# Patient Record
Sex: MALE | Race: WHITE | NOT HISPANIC OR LATINO | Employment: PART TIME | URBAN - METROPOLITAN AREA
[De-identification: names, ages, dates, MRNs, and addresses within clinical notes are randomized per-mention and may not be internally consistent; named-entity substitution may affect disease eponyms.]

---

## 2017-06-06 DIAGNOSIS — M1A.9XX0 CHRONIC GOUT WITHOUT TOPHUS, UNSPECIFIED CAUSE, UNSPECIFIED SITE: ICD-10-CM

## 2017-06-06 NOTE — TELEPHONE ENCOUNTER
allopurinol (ZYLOPRIM) 300 MG tablet  Last Written Prescription Date: 12/20/16  Last Fill Quantity: 90, # refills: 0  Last Office Visit with [facility] or [facility] prescribing provider:  4/23/15        Uric Acid   Date Value Ref Range Status   04/23/2015 3.3 (L) 3.5 - 7.2 mg/dL Final   ]  Creatinine   Date Value Ref Range Status   04/02/2015 0.85 0.66 - 1.25 mg/dL Final   ]  No results found for: WBC  No results found for: RBC  No results found for: HGB  No results found for: HCT  No components found for: MCT  No results found for: MCV  No results found for: MCH  No results found for: MCHC  No results found for: RDW  No results found for: PLT  Lab Results   Component Value Date    AST 20 04/02/2015     Lab Results   Component Value Date    ALT 56 04/02/2015           Idaz Faarax  [facility]

## 2017-06-08 RX ORDER — ALLOPURINOL 300 MG/1
300 TABLET ORAL DAILY
Qty: 90 TABLET | Refills: 0 | Status: SHIPPED | OUTPATIENT
Start: 2017-06-08 | End: 2017-11-16

## 2017-06-08 NOTE — TELEPHONE ENCOUNTER
Routing refill request to provider for review/approval because:  Labs out of range:  Uric Acid  A break in medication    Larisa Victor RN

## 2017-07-05 DIAGNOSIS — M1A.9XX0 CHRONIC GOUT WITHOUT TOPHUS, UNSPECIFIED CAUSE, UNSPECIFIED SITE: ICD-10-CM

## 2017-07-05 NOTE — TELEPHONE ENCOUNTER
colchicine (COLCRYS) 0.6 MG tablet       Last Written Prescription Date: 12/20/16  Last Fill Quantity: 90, # refills: 0  Last Office Visit with [facility] or [facility] prescribing provider:  04/23/15        Uric Acid   Date Value Ref Range Status   04/23/2015 3.3 (L) 3.5 - 7.2 mg/dL Final   ]  Creatinine   Date Value Ref Range Status   04/02/2015 0.85 0.66 - 1.25 mg/dL Final   ]  No results found for: WBC  No results found for: RBC  No results found for: HGB  No results found for: HCT  No components found for: MCT  No results found for: MCV  No results found for: MCH  No results found for: MCHC  No results found for: RDW  No results found for: PLT  Lab Results   Component Value Date    AST 20 04/02/2015     Lab Results   Component Value Date    ALT 56 04/02/2015         [facility]

## 2017-07-08 NOTE — TELEPHONE ENCOUNTER
Routing refill request to provider for review/approval because:  Labs out of range:  Uric Acid  Lesley Hudson RN

## 2017-07-10 RX ORDER — COLCHICINE 0.6 MG/1
0.6 TABLET ORAL DAILY
Qty: 90 TABLET | Refills: 0 | Status: SHIPPED | OUTPATIENT
Start: 2017-07-10 | End: 2017-11-16

## 2017-11-06 DIAGNOSIS — M1A.9XX0 CHRONIC GOUT WITHOUT TOPHUS, UNSPECIFIED CAUSE, UNSPECIFIED SITE: ICD-10-CM

## 2017-11-08 RX ORDER — ALLOPURINOL 300 MG/1
TABLET ORAL
Qty: 90 TABLET | Refills: 0 | OUTPATIENT
Start: 2017-11-08

## 2017-11-08 RX ORDER — COLCHICINE 0.6 MG/1
TABLET ORAL
Qty: 90 TABLET | Refills: 0 | OUTPATIENT
Start: 2017-11-08

## 2017-11-08 NOTE — TELEPHONE ENCOUNTER
Pt advised at last refill ov and labs needed, no appointment's made.  To provider to advise.  Hermelinda Reyna RN

## 2017-11-16 ENCOUNTER — OFFICE VISIT (OUTPATIENT)
Dept: FAMILY MEDICINE | Facility: CLINIC | Age: 32
End: 2017-11-16
Payer: COMMERCIAL

## 2017-11-16 VITALS
TEMPERATURE: 98.3 F | HEART RATE: 71 BPM | HEIGHT: 68 IN | WEIGHT: 213 LBS | DIASTOLIC BLOOD PRESSURE: 75 MMHG | SYSTOLIC BLOOD PRESSURE: 122 MMHG | BODY MASS INDEX: 32.28 KG/M2 | OXYGEN SATURATION: 96 %

## 2017-11-16 DIAGNOSIS — Z28.21 VACCINATION NOT CARRIED OUT BECAUSE OF PATIENT REFUSAL: ICD-10-CM

## 2017-11-16 DIAGNOSIS — M1A.09X0 IDIOPATHIC CHRONIC GOUT OF MULTIPLE SITES WITHOUT TOPHUS: Primary | ICD-10-CM

## 2017-11-16 PROBLEM — E66.811 OBESITY, CLASS I, BMI 30-34.9: Status: ACTIVE | Noted: 2017-11-16

## 2017-11-16 LAB
ALBUMIN SERPL-MCNC: 4.1 G/DL (ref 3.4–5)
ALP SERPL-CCNC: 63 U/L (ref 40–150)
ALT SERPL W P-5'-P-CCNC: 37 U/L (ref 0–70)
ANION GAP SERPL CALCULATED.3IONS-SCNC: 6 MMOL/L (ref 3–14)
AST SERPL W P-5'-P-CCNC: 19 U/L (ref 0–45)
BASOPHILS # BLD AUTO: 0 10E9/L (ref 0–0.2)
BASOPHILS NFR BLD AUTO: 0.4 %
BILIRUB SERPL-MCNC: 0.5 MG/DL (ref 0.2–1.3)
BUN SERPL-MCNC: 12 MG/DL (ref 7–30)
CALCIUM SERPL-MCNC: 8.7 MG/DL (ref 8.5–10.1)
CHLORIDE SERPL-SCNC: 105 MMOL/L (ref 94–109)
CK SERPL-CCNC: 70 U/L (ref 30–300)
CO2 SERPL-SCNC: 27 MMOL/L (ref 20–32)
CREAT SERPL-MCNC: 0.72 MG/DL (ref 0.66–1.25)
DIFFERENTIAL METHOD BLD: NORMAL
EOSINOPHIL # BLD AUTO: 0.2 10E9/L (ref 0–0.7)
EOSINOPHIL NFR BLD AUTO: 1.8 %
ERYTHROCYTE [DISTWIDTH] IN BLOOD BY AUTOMATED COUNT: 12.6 % (ref 10–15)
GFR SERPL CREATININE-BSD FRML MDRD: >90 ML/MIN/1.7M2
GLUCOSE SERPL-MCNC: 95 MG/DL (ref 70–99)
HCT VFR BLD AUTO: 46.8 % (ref 40–53)
HGB BLD-MCNC: 16.2 G/DL (ref 13.3–17.7)
LYMPHOCYTES # BLD AUTO: 2.6 10E9/L (ref 0.8–5.3)
LYMPHOCYTES NFR BLD AUTO: 31.2 %
MCH RBC QN AUTO: 30.1 PG (ref 26.5–33)
MCHC RBC AUTO-ENTMCNC: 34.6 G/DL (ref 31.5–36.5)
MCV RBC AUTO: 87 FL (ref 78–100)
MONOCYTES # BLD AUTO: 0.5 10E9/L (ref 0–1.3)
MONOCYTES NFR BLD AUTO: 6.3 %
NEUTROPHILS # BLD AUTO: 5 10E9/L (ref 1.6–8.3)
NEUTROPHILS NFR BLD AUTO: 60.3 %
PLATELET # BLD AUTO: 230 10E9/L (ref 150–450)
POTASSIUM SERPL-SCNC: 4.5 MMOL/L (ref 3.4–5.3)
PROT SERPL-MCNC: 7.4 G/DL (ref 6.8–8.8)
RBC # BLD AUTO: 5.39 10E12/L (ref 4.4–5.9)
SODIUM SERPL-SCNC: 138 MMOL/L (ref 133–144)
URATE SERPL-MCNC: 10.2 MG/DL (ref 3.5–7.2)
WBC # BLD AUTO: 8.3 10E9/L (ref 4–11)

## 2017-11-16 PROCEDURE — 36415 COLL VENOUS BLD VENIPUNCTURE: CPT | Performed by: FAMILY MEDICINE

## 2017-11-16 PROCEDURE — 84550 ASSAY OF BLOOD/URIC ACID: CPT | Performed by: FAMILY MEDICINE

## 2017-11-16 PROCEDURE — 82550 ASSAY OF CK (CPK): CPT | Performed by: FAMILY MEDICINE

## 2017-11-16 PROCEDURE — 99214 OFFICE O/P EST MOD 30 MIN: CPT | Performed by: FAMILY MEDICINE

## 2017-11-16 PROCEDURE — 80053 COMPREHEN METABOLIC PANEL: CPT | Performed by: FAMILY MEDICINE

## 2017-11-16 PROCEDURE — 85025 COMPLETE CBC W/AUTO DIFF WBC: CPT | Performed by: FAMILY MEDICINE

## 2017-11-16 RX ORDER — ALLOPURINOL 300 MG/1
300 TABLET ORAL DAILY
Qty: 90 TABLET | Refills: 1 | Status: SHIPPED | OUTPATIENT
Start: 2017-11-16 | End: 2018-06-13

## 2017-11-16 RX ORDER — COLCHICINE 0.6 MG/1
0.6 TABLET ORAL DAILY
Qty: 90 TABLET | Refills: 1 | Status: SHIPPED | OUTPATIENT
Start: 2017-11-16 | End: 2019-02-18

## 2017-11-16 ASSESSMENT — PAIN SCALES - GENERAL: PAINLEVEL: NO PAIN (0)

## 2017-11-16 NOTE — NURSING NOTE
"Chief Complaint   Patient presents with     Physical     Recheck Medication     Arthritis       Initial /75 (BP Location: Left arm, Patient Position: Chair, Cuff Size: Adult Large)  Pulse 71  Temp 98.3  F (36.8  C) (Oral)  Ht 5' 8\" (1.727 m)  Wt 213 lb (96.6 kg)  SpO2 96%  BMI 32.39 kg/m2 Estimated body mass index is 32.39 kg/(m^2) as calculated from the following:    Height as of this encounter: 5' 8\" (1.727 m).    Weight as of this encounter: 213 lb (96.6 kg).  Medication Reconciliation: complete   YVONNE Santos MA      "

## 2017-11-16 NOTE — MR AVS SNAPSHOT
After Visit Summary   11/16/2017    Garry Dupree    MRN: [id number]           Patient Information     Date Of Birth          1985        Visit Information        Provider Department      11/16/2017 9:20 AM Nestor Shelton MD [facility]        Today's Diagnoses     Idiopathic chronic gout of multiple sites without tophus    -  1    Vaccination not carried out because of patient refusal          Care Instructions      Preventive Health Recommendations  Male Ages 26 - 39    Yearly exam:             See your health care provider every year in order to  o   Review health changes.   o   Discuss preventive care.    o   Review your medicines if your doctor has prescribed any.    You should be tested each year for STDs (sexually transmitted diseases), if you re at risk.     After age 35, talk to your provider about cholesterol testing. If you are at risk for heart disease, have your cholesterol tested at least every 5 years.     If you are at risk for diabetes, you should have a diabetes test (fasting glucose).  Shots: Get a flu shot each year. Get a tetanus shot every 10 years.     Nutrition:    Eat at least 5 servings of fruits and vegetables daily.     Eat whole-grain bread, whole-wheat pasta and brown rice instead of white grains and rice.     Talk to your provider about Calcium and Vitamin D.     Lifestyle    Exercise for at least 150 minutes a week (30 minutes a day, 5 days a week). This will help you control your weight and prevent disease.     Limit alcohol to one drink per day.     No smoking.     Wear sunscreen to prevent skin cancer.     See your dentist every six months for an exam and cleaning.             Follow-ups after your visit        Follow-up notes from your care team     Return in about 6 months (around 5/16/2018) for recheck medications, lab tests.      Who to contact     If you have questions or need follow up information about today's clinic visit or  "your schedule please contact [facility] directly at [phone].  Normal or non-critical lab and imaging results will be communicated to you by MyChart, letter or phone within 4 business days after the clinic has received the results. If you do not hear from us within 7 days, please contact the clinic through MyChart or phone. If you have a critical or abnormal lab result, we will notify you by phone as soon as possible.  Submit refill requests through [facility] or call your pharmacy and they will forward the refill request to us. Please allow 3 business days for your refill to be completed.          Additional Information About Your Visit        Care EveryWhere ID     This is your Care EveryWhere ID. This could be used by other organizations to access your [facility] medical records  [id number]        Your Vitals Were     Pulse Temperature Height Pulse Oximetry BMI (Body Mass Index)       71 98.3  F (36.8  C) (Oral) 5' 8\" (1.727 m) 96% 32.39 kg/m2        Blood Pressure from Last 3 Encounters:   11/16/17 122/75   04/23/15 123/78   04/02/15 116/80    Weight from Last 3 Encounters:   11/16/17 213 lb (96.6 kg)   04/23/15 200 lb (90.7 kg)   04/02/15 196 lb 12.8 oz (89.3 kg)              We Performed the Following     CBC with platelets differential     CK total     Comprehensive metabolic panel (BMP + Alb, Alk Phos, ALT, AST, Total. Bili, TP)     Uric acid          Today's Medication Changes          These changes are accurate as of: 11/16/17  9:53 AM.  If you have any questions, ask your nurse or doctor.               These medicines have changed or have updated prescriptions.        Dose/Directions    allopurinol 300 MG tablet   Commonly known as:  ZYLOPRIM   This may have changed:  additional instructions   Used for:  Idiopathic chronic gout of multiple sites without tophus   Changed by:  Nestor Shelton MD        Dose:  300 mg   Take 1 tablet (300 mg) by mouth daily for gout prevention. "   Quantity:  90 tablet   Refills:  1       colchicine 0.6 MG tablet   Commonly known as:  COLCRYS   This may have changed:  additional instructions   Used for:  Idiopathic chronic gout of multiple sites without tophus   Changed by:  Nestor Shelton MD        Dose:  0.6 mg   Take 1 tablet (0.6 mg) by mouth daily for gout prevention.   Quantity:  90 tablet   Refills:  1            Where to get your medicines      [facility] [address]  AT [facility]  [address] [facility] [facility] [address]     Phone:  [phone]     allopurinol 300 MG tablet    colchicine 0.6 MG tablet                Primary Care Provider Office Phone # Fax #    Jack Watkins [phone] [address] [facility] [address]        Equal Access to Services     [facility]: Hadii aad ku hadasho Soomaali, waaxda luqadaha, qaybta kaalmada adeegyada, waxay diamondin hayaan amada swartz . So [facility] [phone].    ATENCIÓN: Si habla español, tiene a nelson disposición servicios gratuitos de asistencia lingüística. Llame al [phone].    We comply with applicable federal civil rights laws and [place] laws. We do not discriminate on the basis of race, color, national origin, age, disability, sex, sexual orientation, or gender identity.            Thank you!     Thank you for choosing [facility]  for your care. Our goal is always to provide you with excellent care. Hearing back from our patients is one way we can continue to improve our services. Please take a few minutes to complete the written survey that you may receive in the mail after your visit with us. Thank you!             Your Updated Medication List - Protect others around you: Learn how to safely use, store and throw away your medicines at www.disposemymeds.org.          This list is accurate as of: 11/16/17  9:53 AM.  Always use your most  recent med list.                   Brand Name Dispense Instructions for use Diagnosis    allopurinol 300 MG tablet    ZYLOPRIM    90 tablet    Take 1 tablet (300 mg) by mouth daily for gout prevention.    Idiopathic chronic gout of multiple sites without tophus       colchicine 0.6 MG tablet    COLCRYS    90 tablet    Take 1 tablet (0.6 mg) by mouth daily for gout prevention.    Idiopathic chronic gout of multiple sites without tophus

## 2017-11-16 NOTE — LETTER
November 16, 2017      Garry Dupree  [address] [place] [address]        Dear Mr. Dupree,    Your uric acid level is very high, consistent with untreated gout: This is likely to improve when you recheck it while medicated.  All of the rest of your test results were within the expected range for you.      Resulted Orders   Comprehensive metabolic panel (BMP + Alb, Alk Phos, ALT, AST, Total. Bili, TP)   Result Value Ref Range    Sodium 138 133 - 144 mmol/L    Potassium 4.5 3.4 - 5.3 mmol/L    Chloride 105 94 - 109 mmol/L    Carbon Dioxide 27 20 - 32 mmol/L    Anion Gap 6 3 - 14 mmol/L    Glucose 95 70 - 99 mg/dL    Urea Nitrogen 12 7 - 30 mg/dL    Creatinine 0.72 0.66 - 1.25 mg/dL    GFR Estimate >90 >60 mL/min/1.7m2      Comment:      Non  GFR Calc    GFR Estimate If Black >90 >60 mL/min/1.7m2      Comment:       GFR Calc    Calcium 8.7 8.5 - 10.1 mg/dL    Bilirubin Total 0.5 0.2 - 1.3 mg/dL    Albumin 4.1 3.4 - 5.0 g/dL    Protein Total 7.4 6.8 - 8.8 g/dL    Alkaline Phosphatase 63 40 - 150 U/L    ALT 37 0 - 70 U/L    AST 19 0 - 45 U/L   CBC with platelets differential   Result Value Ref Range    WBC 8.3 4.0 - 11.0 10e9/L    RBC Count 5.39 4.4 - 5.9 10e12/L    Hemoglobin 16.2 13.3 - 17.7 g/dL    Hematocrit 46.8 40.0 - 53.0 %    MCV 87 78 - 100 fl    MCH 30.1 26.5 - 33.0 pg    MCHC 34.6 31.5 - 36.5 g/dL    RDW 12.6 10.0 - 15.0 %    Platelet Count 230 150 - 450 10e9/L    Diff Method Automated Method     % Neutrophils 60.3 %    % Lymphocytes 31.2 %    % Monocytes 6.3 %    % Eosinophils 1.8 %    % Basophils 0.4 %    Absolute Neutrophil 5.0 1.6 - 8.3 10e9/L    Absolute Lymphocytes 2.6 0.8 - 5.3 10e9/L    Absolute Monocytes 0.5 0.0 - 1.3 10e9/L    Absolute Eosinophils 0.2 0.0 - 0.7 10e9/L    Absolute Basophils 0.0 0.0 - 0.2 10e9/L   Uric acid   Result Value Ref Range    Uric Acid 10.2 (H) 3.5 - 7.2 mg/dL   CK total   Result Value Ref Range    CK Total 70 30 - 300 U/L        Please contact the clinic if you have additional questions.  Thank you.    Sincerely,      Nestor Shelton MD/v

## 2017-11-16 NOTE — PROGRESS NOTES
SUBJECTIVE:   CC: SHEREEN Dupree is an 32 year old male who presents for an update/refill of his gout medications.     Healthy Habits:    Do you get at least three servings of calcium containing foods daily (dairy, green leafy vegetables, etc.)? yes    Amount of exercise or daily activities, outside of work: none    Problems taking medications regularly No    Medication side effects: No    Have you had an eye exam in the past two years? yes    Do you see a dentist twice per year? no    Do you have sleep apnea, excessive snoring or daytime drowsiness?no      Today's PHQ-2 Score:   PHQ-2 ( 1999 Pfizer) 4/2/2015 3/13/2014   Q1: Little interest or pleasure in doing things 0 0   Q2: Feeling down, depressed or hopeless 0 0   PHQ-2 Score 0 0       Abuse: Current or Past(Physical, Sexual or Emotional)- NO  Do you feel safe in your environment - YES  Social History   Substance Use Topics     Smoking status: Never Smoker     Smokeless tobacco: Never Used     Alcohol use 7.0 oz/week     14 Standard drinks or equivalent per week      Comment: socially     The patient does not drink >3 drinks per day nor >7 drinks per week.    Past medical, family, and social histories, medications, and allergies are reviewed and updated in Epic.     ROS:  C: NEGATIVE for fever, chills, change in weight  I: NEGATIVE for worrisome rashes, moles or lesions  E: NEGATIVE for vision changes or irritation  ENT: NEGATIVE for ear, mouth and throat problems  R: NEGATIVE for significant cough or SOB  CV: NEGATIVE for chest pain, palpitations or peripheral edema  GI: NEGATIVE for nausea, abdominal pain, heartburn, or change in bowel habits   male: NEGATIVE for dysuria, hematuria, decreased urinary stream, erectile dysfunction, urethral discharge  M: His gout attacks are not just focused on his right ankles, but both his ankles and first MTP joints. He takes both allopurinol and colchicine daily. Since starting these medications 2 years ago, he has  "not had any gout flares. Previously, he was treated with a lot of prednisone and ibuprofen when he had a gout flare and was not on medication. 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 Of note, he has not taken any medication for around a week because he ran out.  N: NEGATIVE for weakness, dizziness or paresthesias  P: NEGATIVE for changes in mood or affect    This document serves as a record of the services and decisions personally performed and made by Dr. Shelton. It was created on his behalf by Cora Rodriguez, a trained medical scribe. The creation of this document is based the provider's statements to the medical scribe.  Cora Rodriguez November 16, 2017 9:41 AM   OBJECTIVE:   /75 (BP Location: Left arm, Patient Position: Chair, Cuff Size: Adult Large)  Pulse 71  Temp 98.3  F (36.8  C) (Oral)  Ht 5' 8\" (1.727 m)  Wt 213 lb (96.6 kg)  SpO2 96%  BMI 32.39 kg/m2  EXAM:  GENERAL: healthy, alert and no distress  EYES: Eyes grossly normal to inspection, PERRL, EOMI, sclerae white and conjunctivae normal  MS: no gross musculoskeletal defects noted, no edema  SKIN: no suspicious lesions or rashes  NEURO: Normal strength and tone, sensory exam grossly normal, mentation intact, oriented times 3 and cranial nerves 2-12 intact  PSYCH: mentation appears normal, affect normal/bright     ASSESSMENT/PLAN:   (M1A.09X0) Idiopathic chronic gout of multiple sites without tophus  (primary encounter diagnosis)  Comment: Current regimen has appeared to prevent gout flares for 2 years. His occupation increases his risk for gout flares. His uric acid may be high today because he has been off the allopurinol for a week.   Plan: colchicine (COLCRYS) 0.6 MG tablet, allopurinol        (ZYLOPRIM) 300 MG tablet, Comprehensive         metabolic panel (BMP + Alb, Alk Phos, ALT, AST,     "    Total. Bili, TP), CBC with platelets         differential, Uric acid, CK total        Return in about 6 months (around 5/16/2018) for recheck medications, lab tests (uric acid while medicated).     (Z28.21) Vaccination not carried out because of patient refusal  Comment: Influenza and TD vaccines offered but declined by the patient.   Plan: VIS provided for both vaccines.    The information in this document, created by the medical scribe for me, accurately reflects the services I personally performed and the decisions made by me. I have reviewed and approved this document for accuracy prior to leaving the patient care area. November 16, 2017 9:41 AM    Nestor Shelton MD  [facility]

## 2018-06-13 DIAGNOSIS — M1A.09X0 IDIOPATHIC CHRONIC GOUT OF MULTIPLE SITES WITHOUT TOPHUS: ICD-10-CM

## 2018-06-13 NOTE — TELEPHONE ENCOUNTER
Routing refill request to provider for review/approval because:  Labs out of range:  Uric acid    Jannette Rivera RN, BSN

## 2018-06-13 NOTE — LETTER
June 21, 2018          Garry Dupree  [address] [place] [address]          Dear Garry,    At [facility] we care about your health and are committed to providing quality patient care. Regular appointments are a vital part of the care and management of your health and can help prevent many of the complications that can occur.      It has come to our attention that you are due for Medication check prior to refills.  Please call [facility] at [phone] soon to schedule your follow up appointment.    If you have transferred care to another clinic please call to inform us so that we do not continue to send you reminder letters.    Sincerely,        [facility] Care Team/[facility]

## 2018-06-13 NOTE — TELEPHONE ENCOUNTER
"Requested Prescriptions   Pending Prescriptions Disp Refills     allopurinol (ZYLOPRIM) 300 MG tablet [Pharmacy Med Name: ALLOPURINOL 300MG TABLETS]  Last Written Prescription Date:  11/16/17  Last Fill Quantity: 90,  # refills: 1   Last Office Visit with [facility] or [facility] prescribing provider:  11/16/17   Future Office Visit:    90 tablet 0     Sig: TAKE 1 TABLET(300 MG) BY MOUTH DAILY FOR GOUT PREVENTION    Gout Agents Protocol Failed    6/13/2018 10:07 AM       Failed - Has Uric Acid on file in past 12 months and value is less than 6    Recent Labs   Lab Test  11/16/17   0959   URIC  10.2*     If level is 6mg/dL or greater, ok to refill one time and refer to provider.          Passed - CBC on file in past 12 months    Recent Labs   Lab Test  11/16/17   0959   WBC  8.3   RBC  5.39   HGB  16.2   HCT  46.8   PLT  230       For [facility] ONLY: [id number] = WBC, [id number] = RBC         Passed - ALT on file in past 12 months    Recent Labs   Lab Test  11/16/17   0959   ALT  37            Passed - Recent (12 mo) or future (30 days) visit within the authorizing provider's specialty    Patient had office visit in the last 12 months or has a visit in the next 30 days with authorizing provider or within the authorizing provider's specialty.  See \"Patient Info\" tab in inbasket, or \"Choose Columns\" in Meds & Orders section of the refill encounter.           Passed - Patient is age 18 or older       Passed - Normal serum creatinine on file in the past 12 months    Recent Labs   Lab Test  11/16/17   0959   CR  0.72               "

## 2018-06-18 RX ORDER — ALLOPURINOL 300 MG/1
TABLET ORAL
Qty: 90 TABLET | Refills: 0 | Status: SHIPPED | OUTPATIENT
Start: 2018-06-18 | End: 2019-02-18

## 2018-06-19 NOTE — TELEPHONE ENCOUNTER
This writer attempted to contact patient on 06/19/18    Reason for call schedule follow up appointment and left detailed message.    If patient calls back:   Relay message need to make appointment for medication recheck, (read verbatim), document that pt called and close encounter    Yana Horner MA

## 2018-06-21 NOTE — TELEPHONE ENCOUNTER
Contacted patient and he hung up.  Letter will be sent to patient instead.    Ubaldo Ibrahim CMA

## 2018-09-26 ENCOUNTER — OFFICE VISIT (OUTPATIENT)
Dept: FAMILY MEDICINE | Facility: CLINIC | Age: 33
End: 2018-09-26
Payer: COMMERCIAL

## 2018-09-26 VITALS
HEIGHT: 68 IN | SYSTOLIC BLOOD PRESSURE: 122 MMHG | BODY MASS INDEX: 32.89 KG/M2 | RESPIRATION RATE: 18 BRPM | HEART RATE: 66 BPM | WEIGHT: 217 LBS | DIASTOLIC BLOOD PRESSURE: 89 MMHG | TEMPERATURE: 97.9 F | OXYGEN SATURATION: 97 %

## 2018-09-26 DIAGNOSIS — G47.00 INSOMNIA, UNSPECIFIED TYPE: ICD-10-CM

## 2018-09-26 DIAGNOSIS — R82.90 BAD ODOR OF URINE: Primary | ICD-10-CM

## 2018-09-26 DIAGNOSIS — M1A.09X0 IDIOPATHIC CHRONIC GOUT OF MULTIPLE SITES WITHOUT TOPHUS: ICD-10-CM

## 2018-09-26 DIAGNOSIS — Z78.9 ALCOHOL USE: ICD-10-CM

## 2018-09-26 DIAGNOSIS — Z11.4 SCREENING FOR HIV (HUMAN IMMUNODEFICIENCY VIRUS): ICD-10-CM

## 2018-09-26 LAB
ALBUMIN SERPL-MCNC: 4 G/DL (ref 3.4–5)
ALBUMIN UR-MCNC: NEGATIVE MG/DL
ALP SERPL-CCNC: 60 U/L (ref 40–150)
ALT SERPL W P-5'-P-CCNC: 48 U/L (ref 0–70)
ANION GAP SERPL CALCULATED.3IONS-SCNC: 8 MMOL/L (ref 3–14)
APPEARANCE UR: CLEAR
AST SERPL W P-5'-P-CCNC: 28 U/L (ref 0–45)
BASOPHILS # BLD AUTO: 0 10E9/L (ref 0–0.2)
BASOPHILS NFR BLD AUTO: 0.3 %
BILIRUB SERPL-MCNC: 0.5 MG/DL (ref 0.2–1.3)
BILIRUB UR QL STRIP: NEGATIVE
BUN SERPL-MCNC: 11 MG/DL (ref 7–30)
CALCIUM SERPL-MCNC: 8.8 MG/DL (ref 8.5–10.1)
CHLORIDE SERPL-SCNC: 107 MMOL/L (ref 94–109)
CO2 SERPL-SCNC: 25 MMOL/L (ref 20–32)
COLOR UR AUTO: YELLOW
CREAT SERPL-MCNC: 0.78 MG/DL (ref 0.66–1.25)
DIFFERENTIAL METHOD BLD: NORMAL
EOSINOPHIL # BLD AUTO: 0.1 10E9/L (ref 0–0.7)
EOSINOPHIL NFR BLD AUTO: 1.7 %
ERYTHROCYTE [DISTWIDTH] IN BLOOD BY AUTOMATED COUNT: 12.6 % (ref 10–15)
GFR SERPL CREATININE-BSD FRML MDRD: >90 ML/MIN/1.7M2
GLUCOSE SERPL-MCNC: 98 MG/DL (ref 70–99)
GLUCOSE UR STRIP-MCNC: NEGATIVE MG/DL
HCT VFR BLD AUTO: 45.7 % (ref 40–53)
HGB BLD-MCNC: 15.9 G/DL (ref 13.3–17.7)
HGB UR QL STRIP: NEGATIVE
KETONES UR STRIP-MCNC: NEGATIVE MG/DL
LEUKOCYTE ESTERASE UR QL STRIP: NEGATIVE
LYMPHOCYTES # BLD AUTO: 2.5 10E9/L (ref 0.8–5.3)
LYMPHOCYTES NFR BLD AUTO: 32.1 %
MCH RBC QN AUTO: 30.7 PG (ref 26.5–33)
MCHC RBC AUTO-ENTMCNC: 34.8 G/DL (ref 31.5–36.5)
MCV RBC AUTO: 88 FL (ref 78–100)
MONOCYTES # BLD AUTO: 0.6 10E9/L (ref 0–1.3)
MONOCYTES NFR BLD AUTO: 7.6 %
NEUTROPHILS # BLD AUTO: 4.5 10E9/L (ref 1.6–8.3)
NEUTROPHILS NFR BLD AUTO: 58.3 %
NITRATE UR QL: NEGATIVE
PH UR STRIP: 8 PH (ref 5–7)
PLATELET # BLD AUTO: 209 10E9/L (ref 150–450)
POTASSIUM SERPL-SCNC: 4.5 MMOL/L (ref 3.4–5.3)
PROT SERPL-MCNC: 7.7 G/DL (ref 6.8–8.8)
RBC # BLD AUTO: 5.18 10E12/L (ref 4.4–5.9)
SODIUM SERPL-SCNC: 140 MMOL/L (ref 133–144)
SOURCE: ABNORMAL
SP GR UR STRIP: 1.01 (ref 1–1.03)
TSH SERPL DL<=0.005 MIU/L-ACNC: 1.09 MU/L (ref 0.4–4)
URATE SERPL-MCNC: 7.2 MG/DL (ref 3.5–7.2)
UROBILINOGEN UR STRIP-ACNC: 0.2 EU/DL (ref 0.2–1)
WBC # BLD AUTO: 7.6 10E9/L (ref 4–11)

## 2018-09-26 PROCEDURE — 80053 COMPREHEN METABOLIC PANEL: CPT | Performed by: PHYSICIAN ASSISTANT

## 2018-09-26 PROCEDURE — 85025 COMPLETE CBC W/AUTO DIFF WBC: CPT | Performed by: PHYSICIAN ASSISTANT

## 2018-09-26 PROCEDURE — 84550 ASSAY OF BLOOD/URIC ACID: CPT | Performed by: PHYSICIAN ASSISTANT

## 2018-09-26 PROCEDURE — 99214 OFFICE O/P EST MOD 30 MIN: CPT | Performed by: PHYSICIAN ASSISTANT

## 2018-09-26 PROCEDURE — 87591 N.GONORRHOEAE DNA AMP PROB: CPT | Performed by: PHYSICIAN ASSISTANT

## 2018-09-26 PROCEDURE — 84443 ASSAY THYROID STIM HORMONE: CPT | Performed by: PHYSICIAN ASSISTANT

## 2018-09-26 PROCEDURE — 81003 URINALYSIS AUTO W/O SCOPE: CPT | Performed by: PHYSICIAN ASSISTANT

## 2018-09-26 PROCEDURE — 36415 COLL VENOUS BLD VENIPUNCTURE: CPT | Performed by: PHYSICIAN ASSISTANT

## 2018-09-26 PROCEDURE — 87389 HIV-1 AG W/HIV-1&-2 AB AG IA: CPT | Performed by: PHYSICIAN ASSISTANT

## 2018-09-26 PROCEDURE — 84425 ASSAY OF VITAMIN B-1: CPT | Mod: 90 | Performed by: PHYSICIAN ASSISTANT

## 2018-09-26 PROCEDURE — 87491 CHLMYD TRACH DNA AMP PROBE: CPT | Performed by: PHYSICIAN ASSISTANT

## 2018-09-26 PROCEDURE — 99000 SPECIMEN HANDLING OFFICE-LAB: CPT | Performed by: PHYSICIAN ASSISTANT

## 2018-09-26 ASSESSMENT — PAIN SCALES - GENERAL: PAINLEVEL: NO PAIN (0)

## 2018-09-26 NOTE — PROGRESS NOTES
"  SUBJECTIVE:   Garry Dupree is a 32 year old male who presents to clinic today for the following health issues:      Concern - Urine and body odor poss due to medication  Onset: about 2 weeks ago    Description:   Urine and body odor    Intensity: moderate    Progression of Symptoms:  same    Accompanying Signs & Symptoms:  None, sleepless    Previous history of similar problem:   None    Precipitating factors:   Worsened by: none      Therapies Tried and outcome: water, Deoderant, no relief    Has been on allopurinol and colchicine for ~ 2 years.      Drinks 1-4 alcohol bevs 5 nights a week.  ~10-15 /week.    Has not been sleeping well.  Hard time staying asleep.   Trying melatonin but is sedating. Works irregular sched.        No Known Allergies    Past Medical History:   Diagnosis Date     Gout        Patient Active Problem List   Diagnosis     CARDIOVASCULAR SCREENING; LDL GOAL LESS THAN 160     Idiopathic chronic gout of multiple sites without tophus     Vaccination not carried out because of patient refusal     Obesity, Class I, BMI 30-34.9         Current Outpatient Prescriptions on File Prior to Visit:  allopurinol (ZYLOPRIM) 300 MG tablet TAKE 1 TABLET(300 MG) BY MOUTH DAILY FOR GOUT PREVENTION   colchicine (COLCRYS) 0.6 MG tablet Take 1 tablet (0.6 mg) by mouth daily for gout prevention.     No current facility-administered medications on file prior to visit.     Social History   Substance Use Topics     Smoking status: Never Smoker     Smokeless tobacco: Never Used     Alcohol use 7.0 oz/week     14 Standard drinks or equivalent per week      Comment: socially       Family History   Problem Relation Age of Onset     Arthritis Father        ROS:  General: negative for fever  PSYCH insomnia as above  RHEUM hx gout   no dysuria    OBJECTIVE:  /89 (BP Location: Left arm, Patient Position: Sitting, Cuff Size: Adult Large)  Pulse 66  Temp 97.9  F (36.6  C) (Oral)  Resp 18  Ht 5' 8\" (1.727 m)  " Wt 217 lb (98.4 kg)  SpO2 97%  BMI 32.99 kg/m2   General:   awake, alert, and cooperative.  NAD.   Head: Normocephalic, atraumatic.  Eyes: Conjunctiva clear,   Lungs: regular rate   Neuro: Alert and oriented - normal speech.  PSYCH- good insight,    ASSESSMENT:well appearing    ICD-10-CM    1. Bad odor of urine R82.90 UA reflex to Microscopic and Culture     Neisseria gonorrhoeae PCR     Chlamydia trachomatis PCR     Comprehensive metabolic panel     CBC with platelets differential     TSH with free T4 reflex   2. Insomnia, unspecified type G47.00    3. Idiopathic chronic gout of multiple sites without tophus M1A.09X0 Uric acid   4. Alcohol use Z78.9 Vitamin B1 whole blood   5. Screening for HIV (human immunodeficiency virus) Z11.4 HIV Screening       PLAN: sleep hygiene discussed  Follow up:  6 wks or prn  Advised about symptoms which might herald more serious problems.

## 2018-09-26 NOTE — PATIENT INSTRUCTIONS
Insomnia  Insomnia is repeated difficulty going to sleep or staying asleep, or both. Whether you have insomnia is not defined by a specific amount of sleep. Different people need different amounts of sleep, and you may need more or less sleep at different times of your life.  There are 3 major types of insomnia:  short-term, chronic, and  other.   Short-term, or acute insomnia lasts less than 3 months.  The symptoms are temporary and can be linked directly to a stressor, such as the death of a loved one, financial problems, or a new physical problem.  Short-term insomnia stops when the stressor resolves or the person adapts to its presence.  Chronic insomnia occurs at least 3 times a week and lasts longer than 3 months.  Chronic insomnia can occur when either the cause of the sleeping problem is not clear, or the insomnia does not get better when the stressor is resolved. A number of other criteria are also used to make the diagnosis of chronic insomnia.    Other insomnia  is the third type of insomnia-related sleep disorders.  This description applies to people who have problems getting to sleep or staying asleep, but do not meet all of the factors that describe either short-term or chronic insomnia.    Many things cause insomnia. Different people may have different causes. It can be from an underlying medical or psychological condition, or lifestyle. It can also be primary insomnia, which means no cause can be found.  Causes of insomnia include:    Chronic medical problems- heart disease, gastrointestinal problems, hormonal changes, breathing problems    Anxiety    Stress    Depression    Pain    Work schedule    Sleep apnea    Illegal drugs    Certain medicines  Many different medidcines can affect your sleep, such as stimulants, caffeine, alcohol, some decongestants, and diet pills. Other medicines may include some types of blood pressure pills, steroids, asthma medicines, antihistamines, antidepressants,  seizure medicines and statins. Not all of these will affect your sleep, and they shouldn t be stopped without talking to your doctor.  Symptoms of insomnia can include:    Lying awake for long periods at night before falling asleep    Waking up several times during the night    Waking up early in the morning and not being able to get back to sleep    Feeling tired and not refreshed by sleep    Not being able to function properly during the day and finding it hard to concentrate    Irritability    Tiredness and fatigue during the day  Home care    Review your medicines with your doctor or pharmacist to find out if they can cause insomnia. Not all medicines will affect your sleep, but they shouldn't be stopped without reviewing them with your doctor. There may be serious side effects and consequences from suddenly stopping your medicines. Not taking them may cause strokes, heart attacks, and many other problems.    Caffeine, smoking and alcohol also affect sleep. Limit your daily use and do not use these before bedtime. Alcohol may make you sleepy at first, but as its effects wear off, you may awaken a few hours later and have trouble returning to sleep.    Do not exercise, eat or drink large amounts of liquid within 2 hours of your bedtime.    Improve your sleep habits. Have a fixed bed and wake-up time. Try to keep noise, light and heat in your bedroom at a comfortable level. Try using earplugs or eyeshades if needed.     Avoid watching TV in bed.    If you do not fall asleep within 30 minutes, try to relax by reading or listening to soft music.    Limit daytime napping to one 30 minute period, early in the day.    Get regular exercise. Find other ways to lessen your stress level.    If a medicine was prescribed to help reset your sleep patterns, take it as directed. Sleeping pills are intended for short-term use, only. If taken for too long, the effect wears off while the risk of physical addiction and  psychological dependence increases.  Sleep diary  If the cause isn t obvious and it is not improving, try keeping a  sleep diary  for a couple of weeks. Include in it:    The time you go to bed    How long it takes to fall asleep    How many times you wake up    What time you wake up    Your meal times and what you eat    What time you drink alcohol    Your exercise habits and times  Follow-up care  Follow up with your healthcare provider, or as advised. If X-rays or CT scans were done, you will be notified if there is a change in the reading, especially if it affects treatment.  Call 911  Call 911 if any of these occur:    Trouble breathing    Confusion or trouble waking    Fainting or loss of consciousness    Rapid heart rate    New chest, arm, shoulder, neck or upper back pain    Trouble with speech or vision, weakness of an arm or leg    Trouble walking or talking, loss of balance, numbness or weakness in one side of your body, facial droop  When to seek medical advice  Call your healthcare provider right away if any of these occur:    Extreme restlessness or irritability    Confusion or hallucinations (seeing or hearing things that are not there)    Anxiety, depression    Several days without sleeping  Date Last Reviewed: 11/19/2015 2000-2017 [employer]. [address] [place] [address], [place] [address]. All rights reserved. This information is not intended as a substitute for professional medical care. Always follow your healthcare professional's instructions.

## 2018-09-26 NOTE — LETTER
October 1, 2018      Garry Dupree  [address]  [facility] [address]        Dear Garry Dupree    All of your labs were normal.     Please contact the clinic if you have additional questions or if symptoms persist.  Thank you.      Enclosed is a copy of the results.  It was a pleasure to see you at your last appointment.      Sincerely,      Rell Gomez PA-C/N. [facility]      Results for orders placed or performed in visit on 09/26/18   UA reflex to Microscopic and Culture   Result Value Ref Range    Color Urine Yellow     Appearance Urine Clear     Glucose Urine Negative NEG^Negative mg/dL    Bilirubin Urine Negative NEG^Negative    Ketones Urine Negative NEG^Negative mg/dL    Specific Gravity Urine 1.015 1.003 - 1.035    Blood Urine Negative NEG^Negative    pH Urine 8.0 (H) 5.0 - 7.0 pH    Protein Albumin Urine Negative NEG^Negative mg/dL    Urobilinogen Urine 0.2 0.2 - 1.0 EU/dL    Nitrite Urine Negative NEG^Negative    Leukocyte Esterase Urine Negative NEG^Negative    Source Midstream Urine    HIV Screening   Result Value Ref Range    HIV Antigen Antibody Combo Nonreactive NR^Nonreactive       Comprehensive metabolic panel   Result Value Ref Range    Sodium 140 133 - 144 mmol/L    Potassium 4.5 3.4 - 5.3 mmol/L    Chloride 107 94 - 109 mmol/L    Carbon Dioxide 25 20 - 32 mmol/L    Anion Gap 8 3 - 14 mmol/L    Glucose 98 70 - 99 mg/dL    Urea Nitrogen 11 7 - 30 mg/dL    Creatinine 0.78 0.66 - 1.25 mg/dL    GFR Estimate >90 >60 mL/min/1.7m2    GFR Estimate If Black >90 >60 mL/min/1.7m2    Calcium 8.8 8.5 - 10.1 mg/dL    Bilirubin Total 0.5 0.2 - 1.3 mg/dL    Albumin 4.0 3.4 - 5.0 g/dL    Protein Total 7.7 6.8 - 8.8 g/dL    Alkaline Phosphatase 60 40 - 150 U/L    ALT 48 0 - 70 U/L    AST 28 0 - 45 U/L   CBC with platelets differential   Result Value Ref Range    WBC 7.6 4.0 - 11.0 10e9/L    RBC Count 5.18 4.4 - 5.9 10e12/L    Hemoglobin 15.9 13.3 - 17.7 g/dL    Hematocrit 45.7 40.0 - 53.0 %    MCV 88 78  - 100 fl    MCH 30.7 26.5 - 33.0 pg    MCHC 34.8 31.5 - 36.5 g/dL    RDW 12.6 10.0 - 15.0 %    Platelet Count 209 150 - 450 10e9/L    Diff Method Automated Method     % Neutrophils 58.3 %    % Lymphocytes 32.1 %    % Monocytes 7.6 %    % Eosinophils 1.7 %    % Basophils 0.3 %    Absolute Neutrophil 4.5 1.6 - 8.3 10e9/L    Absolute Lymphocytes 2.5 0.8 - 5.3 10e9/L    Absolute Monocytes 0.6 0.0 - 1.3 10e9/L    Absolute Eosinophils 0.1 0.0 - 0.7 10e9/L    Absolute Basophils 0.0 0.0 - 0.2 10e9/L   TSH with free T4 reflex   Result Value Ref Range    TSH 1.09 0.40 - 4.00 mU/L   Uric acid   Result Value Ref Range    Uric Acid 7.2 3.5 - 7.2 mg/dL   Vitamin B1 whole blood   Result Value Ref Range    Vitamin B1 Whole Blood Level 140 70 - 180 nmol/L   Neisseria gonorrhoeae PCR   Result Value Ref Range    Specimen Descrip Urine     N Gonorrhea PCR Negative NEG^Negative   Chlamydia trachomatis PCR   Result Value Ref Range    Specimen Description Urine     Chlamydia Trachomatis PCR Negative NEG^Negative

## 2018-09-26 NOTE — MR AVS SNAPSHOT
After Visit Summary   9/26/2018    Garry Dupree    MRN: [id number]           Patient Information     Date Of Birth          1985        Visit Information        Provider Department      9/26/2018 12:00 PM Gabriel Gomez PA [facility]        Today's Diagnoses     Bad odor of urine    -  1    Screening for HIV (human immunodeficiency virus)        Idiopathic chronic gout of multiple sites without tophus        Alcohol use        Insomnia, unspecified type          Care Instructions      Insomnia  Insomnia is repeated difficulty going to sleep or staying asleep, or both. Whether you have insomnia is not defined by a specific amount of sleep. Different people need different amounts of sleep, and you may need more or less sleep at different times of your life.  There are 3 major types of insomnia:  short-term, chronic, and  other.   Short-term, or acute insomnia lasts less than 3 months.  The symptoms are temporary and can be linked directly to a stressor, such as the death of a loved one, financial problems, or a new physical problem.  Short-term insomnia stops when the stressor resolves or the person adapts to its presence.  Chronic insomnia occurs at least 3 times a week and lasts longer than 3 months.  Chronic insomnia can occur when either the cause of the sleeping problem is not clear, or the insomnia does not get better when the stressor is resolved. A number of other criteria are also used to make the diagnosis of chronic insomnia.    Other insomnia  is the third type of insomnia-related sleep disorders.  This description applies to people who have problems getting to sleep or staying asleep, but do not meet all of the factors that describe either short-term or chronic insomnia.    Many things cause insomnia. Different people may have different causes. It can be from an underlying medical or psychological condition, or lifestyle. It can also be primary  insomnia, which means no cause can be found.  Causes of insomnia include:    Chronic medical problems- heart disease, gastrointestinal problems, hormonal changes, breathing problems    Anxiety    Stress    Depression    Pain    Work schedule    Sleep apnea    Illegal drugs    Certain medicines  Many different medidcines can affect your sleep, such as stimulants, caffeine, alcohol, some decongestants, and diet pills. Other medicines may include some types of blood pressure pills, steroids, asthma medicines, antihistamines, antidepressants, seizure medicines and statins. Not all of these will affect your sleep, and they shouldn t be stopped without talking to your doctor.  Symptoms of insomnia can include:    Lying awake for long periods at night before falling asleep    Waking up several times during the night    Waking up early in the morning and not being able to get back to sleep    Feeling tired and not refreshed by sleep    Not being able to function properly during the day and finding it hard to concentrate    Irritability    Tiredness and fatigue during the day  Home care    Review your medicines with your doctor or pharmacist to find out if they can cause insomnia. Not all medicines will affect your sleep, but they shouldn't be stopped without reviewing them with your doctor. There may be serious side effects and consequences from suddenly stopping your medicines. Not taking them may cause strokes, heart attacks, and many other problems.    Caffeine, smoking and alcohol also affect sleep. Limit your daily use and do not use these before bedtime. Alcohol may make you sleepy at first, but as its effects wear off, you may awaken a few hours later and have trouble returning to sleep.    Do not exercise, eat or drink large amounts of liquid within 2 hours of your bedtime.    Improve your sleep habits. Have a fixed bed and wake-up time. Try to keep noise, light and heat in your bedroom at a comfortable level. Try  using earplugs or eyeshades if needed.     Avoid watching TV in bed.    If you do not fall asleep within 30 minutes, try to relax by reading or listening to soft music.    Limit daytime napping to one 30 minute period, early in the day.    Get regular exercise. Find other ways to lessen your stress level.    If a medicine was prescribed to help reset your sleep patterns, take it as directed. Sleeping pills are intended for short-term use, only. If taken for too long, the effect wears off while the risk of physical addiction and psychological dependence increases.  Sleep diary  If the cause isn t obvious and it is not improving, try keeping a  sleep diary  for a couple of weeks. Include in it:    The time you go to bed    How long it takes to fall asleep    How many times you wake up    What time you wake up    Your meal times and what you eat    What time you drink alcohol    Your exercise habits and times  Follow-up care  Follow up with your healthcare provider, or as advised. If X-rays or CT scans were done, you will be notified if there is a change in the reading, especially if it affects treatment.  Call 911  Call 911 if any of these occur:    Trouble breathing    Confusion or trouble waking    Fainting or loss of consciousness    Rapid heart rate    New chest, arm, shoulder, neck or upper back pain    Trouble with speech or vision, weakness of an arm or leg    Trouble walking or talking, loss of balance, numbness or weakness in one side of your body, facial droop  When to seek medical advice  Call your healthcare provider right away if any of these occur:    Extreme restlessness or irritability    Confusion or hallucinations (seeing or hearing things that are not there)    Anxiety, depression    Several days without sleeping  Date Last Reviewed: 11/19/2015 2000-2017 [employer]. [address] [place] [address], [place] [address]. All rights reserved. This information is not intended as a substitute for  "professional medical care. Always follow your healthcare professional's instructions.                Follow-ups after your visit        Follow-up notes from your care team     Return in about 6 weeks (around 2018), or if symptoms worsen or fail to improve, for PCP Follow-up.      Who to contact     If you have questions or need follow up information about today's clinic visit or your schedule please contact [facility] directly at [phone].  Normal or non-critical lab and imaging results will be communicated to you by Localmindhart, letter or phone within 4 business days after the clinic has received the results. If you do not hear from us within 7 days, please contact the clinic through S[employer]ic[employer] or phone. If you have a critical or abnormal lab result, we will notify you by phone as soon as possible.  Submit refill requests through [employer] or call your pharmacy and they will forward the refill request to us. Please allow 3 business days for your refill to be completed.          Additional Information About Your Visit        LocalmindharNetotiate Information     [employer] lets you send messages to your doctor, view your test results, renew your prescriptions, schedule appointments and more. To sign up, go to [email or web address] . Click on \"Log in\" on the left side of the screen, which will take you to the Welcome page. Then click on \"Sign up Now\" on the right side of the page.     You will be asked to enter the access code listed below, as well as some personal information. Please follow the directions to create your username and password.     Your access code is: [email or web address]  Expires: 2018 12:32 PM     Your access code will  in 90 days. If you need help or a new code, please call your [place] clinic or [phone].        Care EveryWhere ID     This is your Care EveryWhere ID. This could be used by other organizations to access your [place] medical records  [id number]        Your Vitals " "Were     Pulse Temperature Respirations Height Pulse Oximetry BMI (Body Mass Index)    66 97.9  F (36.6  C) (Oral) 18 5' 8\" (1.727 m) 97% 32.99 kg/m2       Blood Pressure from Last 3 Encounters:   09/26/18 122/89   11/16/17 122/75   04/23/15 123/78    Weight from Last 3 Encounters:   09/26/18 217 lb (98.4 kg)   11/16/17 213 lb (96.6 kg)   04/23/15 200 lb (90.7 kg)              We Performed the Following     CBC with platelets differential     Chlamydia trachomatis PCR     Comprehensive metabolic panel     HIV Screening     Neisseria gonorrhoeae PCR     TSH with free T4 reflex     UA reflex to Microscopic and Culture     Uric acid     Vitamin B1 whole blood        Primary Care Provider Office Phone # Fax #    Jack Watkins [phone] [address] [place]  [facility] [address]        Equal Access to Services     [facility]: Hadii aad ku hadasho Soomaali, waaxda luqadaha, qaybta kaalmada adeegyada, waxay diamondin hayholan amada swartz . So [facility] [place] [phone].    ATENCIÓN: Si habla español, tiene a nelson disposición servicios gratuitos de asistencia lingüística. Llame al [phone].    We comply with applicable federal civil rights laws and [place] laws. We do not discriminate on the basis of race, color, national origin, age, disability, sex, sexual orientation, or gender identity.            Thank you!     Thank you for choosing [facility]  for your care. Our goal is always to provide you with excellent care. Hearing back from our patients is one way we can continue to improve our services. Please take a few minutes to complete the written survey that you may receive in the mail after your visit with us. Thank you!             Your Updated Medication List - Protect others around you: Learn how to safely use, store and throw away your medicines at www.disposemymeds.org.          This list is accurate as of 9/26/18 12:32 PM.  Always use your most recent med list.             "       Brand Name Dispense Instructions for use Diagnosis    allopurinol 300 MG tablet    ZYLOPRIM    90 tablet    TAKE 1 TABLET(300 MG) BY MOUTH DAILY FOR GOUT PREVENTION    Idiopathic chronic gout of multiple sites without tophus       colchicine 0.6 MG tablet    COLCRYS    90 tablet    Take 1 tablet (0.6 mg) by mouth daily for gout prevention.    Idiopathic chronic gout of multiple sites without tophus

## 2018-09-27 LAB
C TRACH DNA SPEC QL NAA+PROBE: NEGATIVE
HIV 1+2 AB+HIV1 P24 AG SERPL QL IA: NONREACTIVE
N GONORRHOEA DNA SPEC QL NAA+PROBE: NEGATIVE
SPECIMEN SOURCE: NORMAL
SPECIMEN SOURCE: NORMAL

## 2018-09-29 LAB — VIT B1 BLD-MCNC: 140 NMOL/L (ref 70–180)

## 2018-09-30 NOTE — PROGRESS NOTES
Please send letter if doesn't check mychart.  Rell Gomez PA-C    Mr. Dupree,    All of your labs were normal.    Please contact the clinic if you have additional questions or if symptoms persist.  Thank you.    Sincerely,    Gabriel Gomez

## 2019-02-18 DIAGNOSIS — M1A.09X0 IDIOPATHIC CHRONIC GOUT OF MULTIPLE SITES WITHOUT TOPHUS: ICD-10-CM

## 2019-02-18 NOTE — TELEPHONE ENCOUNTER
"Requested Prescriptions   Pending Prescriptions Disp Refills     allopurinol (ZYLOPRIM) 300 MG tablet [Pharmacy Med Name: ALLOPURINOL 300MG TABLETS] 90 tablet 0    Last Written Prescription Date:  6/18/19  Last Fill Quantity: 90,  # refills: 0   Last Office Visit with [facility], [facility] or [facility] prescribing provider:  9/26/18   Future Office Visit:      Sig: TAKE 1 TABLET(300 MG) BY MOUTH DAILY FOR GOUT PREVENTION    Gout Agents Protocol Failed - 2/18/2019  8:50 AM       Failed - Has Uric Acid on file in past 12 months and value is less than 6    Recent Labs   Lab Test 09/26/18  1235   URIC 7.2     If level is 6mg/dL or greater, ok to refill one time and refer to provider.          Passed - CBC on file in past 12 months    Recent Labs   Lab Test 09/26/18  1235   WBC 7.6   RBC 5.18   HGB 15.9   HCT 45.7                   Passed - ALT on file in past 12 months    Recent Labs   Lab Test 09/26/18  1235   ALT 48            Passed - Recent (12 mo) or future (30 days) visit within the authorizing provider's specialty    Patient had office visit in the last 12 months or has a visit in the next 30 days with authorizing provider or within the authorizing provider's specialty.  See \"Patient Info\" tab in inbasket, or \"Choose Columns\" in Meds & Orders section of the refill encounter.             Passed - Medication is active on med list       Passed - Patient is age 18 or older       Passed - Normal serum creatinine on file in the past 12 months    Recent Labs   Lab Test 09/26/18  1235   CR 0.78             COLCRYS 0.6 MG tablet [Pharmacy Med Name: COLCRYS 0.6MG TABLETS] 90 tablet 0    Last Written Prescription Date:  11/16/17  Last Fill Quantity: 90,  # refills: 1   [facility] or [facility] prescribing provider:  9/26/18   Future Office Visit:      Sig: TAKE 1 TABLET(0.6 MG) BY MOUTH DAILY FOR GOUT PREVENTION    Gout Agents Protocol Failed - 2/18/2019  8:50 AM       Failed - Has Uric Acid on file in past 12 " "months and value is less than 6    Recent Labs   Lab Test 09/26/18  1235   URIC 7.2     If level is 6mg/dL or greater, ok to refill one time and refer to provider.          Passed - CBC on file in past 12 months    Recent Labs   Lab Test 09/26/18  1235   WBC 7.6   RBC 5.18   HGB 15.9   HCT 45.7                   Passed - ALT on file in past 12 months    Recent Labs   Lab Test 09/26/18  1235   ALT 48            Passed - Recent (12 mo) or future (30 days) visit within the authorizing provider's specialty    Patient had office visit in the last 12 months or has a visit in the next 30 days with authorizing provider or within the authorizing provider's specialty.  See \"Patient Info\" tab in inbasket, or \"Choose Columns\" in Meds & Orders section of the refill encounter.             Passed - Medication is active on med list       Passed - Patient is age 18 or older       Passed - Normal serum creatinine on file in the past 12 months    Recent Labs   Lab Test 09/26/18  1235   CR 0.78               "

## 2019-02-20 RX ORDER — ALLOPURINOL 300 MG/1
TABLET ORAL
Qty: 90 TABLET | Refills: 0 | Status: SHIPPED | OUTPATIENT
Start: 2019-02-20 | End: 2020-07-01

## 2019-02-20 RX ORDER — COLCHICINE 0.6 MG/1
TABLET, FILM COATED ORAL
Qty: 90 TABLET | Refills: 0 | Status: SHIPPED | OUTPATIENT
Start: 2019-02-20 | End: 2020-07-01

## 2020-03-02 ENCOUNTER — HEALTH MAINTENANCE LETTER (OUTPATIENT)
Age: 35
End: 2020-03-02

## 2020-06-23 DIAGNOSIS — M1A.09X0 IDIOPATHIC CHRONIC GOUT OF MULTIPLE SITES WITHOUT TOPHUS: ICD-10-CM

## 2020-06-23 NOTE — LETTER
Garry Dupree  [address] [facility] [address]          06/30/20    Hi, Garry Dupree    Thank you for your Refill request. Based on your concerns, more evaluation is needed. The good news is we have ways for you to virtually meet with your provider without traveling to the clinic.    Video Visit:  Please schedule a video visit. Request an appointment through MindBodyGreen or call us at [phone].    A video visit is a two-way, real-time, interactive video and audio appointment with your provider, using a secure newton called AW Touchpoint on your mobile device or a browser on your computer. Video visits will be billed the same as in-person visits, including deductibles and co-insurance.     OR    Telephone Visit:  You can schedule a telephone visit. Request an appointment through [employer] or call us at [phone].    A telephone visit is a two-way, real-time, audio scheduled appointment with your provider. Telephone visits are billed at different rates depending on your insurance coverage. During this emergency period, for some insurers, patients may be billed the same as an in-person visit. Please reach out to your insurance provider with any questions.     Thank you for choosing us your partner in health care.     Your [facility] Team

## 2020-06-24 NOTE — TELEPHONE ENCOUNTER
Team to please call patient and schedule medication recheck appointment. Please find out if patient has enough medication to last until appointment once scheduled then route back to refill pool. Thank you.        Jannette Rivera RN, BSN, PHN

## 2020-06-25 NOTE — TELEPHONE ENCOUNTER
This writer attempted to contact pt on 06/25/20      Reason for call schedule virtual visit and left message.      If patient calls back:   Schedule virtual appointment within 1 week with primary care, document that pt called and close encounter         Ludivina Bullard MA

## 2020-06-30 NOTE — TELEPHONE ENCOUNTER
This writer attempted to contact Patient on 06/30/20      Reason for call needs visit and left message and sent letter.      If patient calls back:   Schedule Virtual appointment within 1 week with PCP, document that pt called and close encounter         Harini Gordillo MA

## 2020-07-01 RX ORDER — COLCHICINE 0.6 MG/1
TABLET ORAL
Qty: 90 TABLET | Refills: 0 | Status: SHIPPED | OUTPATIENT
Start: 2020-07-01 | End: 2021-09-07

## 2020-07-01 RX ORDER — ALLOPURINOL 300 MG/1
TABLET ORAL
Qty: 90 TABLET | Refills: 0 | Status: SHIPPED | OUTPATIENT
Start: 2020-07-01 | End: 2022-09-26

## 2020-07-01 NOTE — TELEPHONE ENCOUNTER
LM for pt to call clinic.  3rd attempt, with no response.  Please advise.      Ludivina MANSFIELD, Patient Care

## 2020-12-20 ENCOUNTER — HEALTH MAINTENANCE LETTER (OUTPATIENT)
Age: 35
End: 2020-12-20

## 2021-04-24 ENCOUNTER — HEALTH MAINTENANCE LETTER (OUTPATIENT)
Age: 36
End: 2021-04-24

## 2021-07-19 ENCOUNTER — NURSE TRIAGE (OUTPATIENT)
Dept: FAMILY MEDICINE | Facility: CLINIC | Age: 36
End: 2021-07-19

## 2021-07-19 NOTE — TELEPHONE ENCOUNTER
"Recommend appt today based on protocol. Transferred to scheduling.      Reason for Disposition    Chest pain(s) lasting a few seconds persists > 3 days    Answer Assessment - Initial Assessment Questions  1. LOCATION: \"Where does it hurt?\"        Right in the center of his chest   2. RADIATION: \"Does the pain go anywhere else?\" (e.g., into neck, jaw, arms, back)      No   3. ONSET: \"When did the chest pain begin?\" (Minutes, hours or days)       Started about 3 days ago  4. PATTERN \"Does the pain come and go, or has it been constant since it started?\"  \"Does it get worse with exertion?\"       It hurts laying down and getting out of bed hurts and lifting stuff hurts seems to hurt with movements   5. DURATION: \"How long does it last\" (e.g., seconds, minutes, hours)      Only when I move and get up   6. SEVERITY: \"How bad is the pain?\"  (e.g., Scale 1-10; mild, moderate, or severe)     - MILD (1-3): doesn't interfere with normal activities      - MODERATE (4-7): interferes with normal activities or awakens from sleep     - SEVERE (8-10): excruciating pain, unable to do any normal activities        Getting out of bed it does hurt really does hurt like a 7/10 but when I am not moving it does not hurt   7. CARDIAC RISK FACTORS: \"Do you have any history of heart problems or risk factors for heart disease?\" (e.g., angina, prior heart attack; diabetes, high blood pressure, high cholesterol, smoker, or strong family history of heart disease)      No  8. PULMONARY RISK FACTORS: \"Do you have any history of lung disease?\"  (e.g., blood clots in lung, asthma, emphysema, birth control pills)      non  9. CAUSE: \"What do you think is causing the chest pain?\"     Not sure   10. OTHER SYMPTOMS: \"Do you have any other symptoms?\" (e.g., dizziness, nausea, vomiting, sweating, fever, difficulty breathing, cough)        No   11. PREGNANCY: \"Is there any chance you are pregnant?\" \"When was your last menstrual period?\"    Protocols used: " CHEST PAIN-A-OH    Margy Castanon RN on 7/19/2021 at 7:29 AM

## 2021-07-29 ENCOUNTER — ANCILLARY PROCEDURE (OUTPATIENT)
Dept: GENERAL RADIOLOGY | Facility: CLINIC | Age: 36
End: 2021-07-29
Attending: PHYSICIAN ASSISTANT
Payer: COMMERCIAL

## 2021-07-29 ENCOUNTER — OFFICE VISIT (OUTPATIENT)
Dept: FAMILY MEDICINE | Facility: CLINIC | Age: 36
End: 2021-07-29
Payer: COMMERCIAL

## 2021-07-29 VITALS
OXYGEN SATURATION: 98 % | HEIGHT: 68 IN | BODY MASS INDEX: 34.26 KG/M2 | SYSTOLIC BLOOD PRESSURE: 133 MMHG | WEIGHT: 226.05 LBS | DIASTOLIC BLOOD PRESSURE: 88 MMHG | TEMPERATURE: 97.9 F | HEART RATE: 78 BPM

## 2021-07-29 DIAGNOSIS — R07.9 CHEST PAIN, UNSPECIFIED TYPE: ICD-10-CM

## 2021-07-29 DIAGNOSIS — R07.89 COSTOCHONDRAL CHEST PAIN: Primary | ICD-10-CM

## 2021-07-29 PROCEDURE — 93000 ELECTROCARDIOGRAM COMPLETE: CPT | Performed by: PHYSICIAN ASSISTANT

## 2021-07-29 PROCEDURE — 99213 OFFICE O/P EST LOW 20 MIN: CPT | Performed by: PHYSICIAN ASSISTANT

## 2021-07-29 PROCEDURE — 71046 X-RAY EXAM CHEST 2 VIEWS: CPT | Performed by: RADIOLOGY

## 2021-07-29 ASSESSMENT — MIFFLIN-ST. JEOR: SCORE: 1938.48

## 2021-07-29 ASSESSMENT — PAIN SCALES - GENERAL: PAINLEVEL: MILD PAIN (2)

## 2021-07-29 NOTE — PROGRESS NOTES
"    Assessment & Plan     Costochondral chest pain  Chest pain, unspecified type  Negative EKG and CXR. Very focal area of pain suggestive of musculoskeletal rather than cardiac or pulmonary in nature. Discussed use of ibuprofen for symptoms. If worsening, developing shortness of breath, increased pain - would warrant a visit to the ED. Patient verbalizes understanding. Return to clinic in 2-3 weeks if not noting improvement.   - EKG 12-lead complete w/read - Clinics  - XR Chest 2 Views; Future       Tobacco Cessation:   reports that he has been smoking. He has never used smokeless tobacco.  Tobacco Cessation Action Plan: Information offered: Patient not interested at this time    BMI:   Estimated body mass index is 34.46 kg/m  as calculated from the following:    Height as of this encounter: 1.725 m (5' 7.91\").    Weight as of this encounter: 102.5 kg (226 lb 0.8 oz).   Weight management plan: Discussed healthy diet and exercise guidelines        Return in about 3 weeks (around 8/19/2021) for worsening symptoms or failure to improve..    Hailey Cabello PA-C  [facility] LADY Rendon is a 35 year old who presents for the following health issues     HPI     Chest Pain  Onset/Duration: X1 week  Description:   Location: right side  Character: sharp  Radiation: None  Duration: intermittent, worse in the AM, wakes up in middle of the night with it  Intensity: 2/10  Progression of Symptoms: intermittent  Accompanying Signs & Symptoms:  Shortness of breath: no  Sweating: no  Nausea/vomiting: no  Lightheadedness: no  Palpitations: YES  Fever/Chills: no  Cough: no           Heartburn: no  History:   Family history of heart disease: no  Tobacco use: YES- E cig  Previous similar symptoms: no   Precipitating factors:   Worse with exertion: YES  Worse with deep breaths: no           Related to eating: no            Better with burping: no  Alleviating factors: None  Therapies tried and outcome: " "None    Has pain at rest. Right sided - very focal spot near sternum. Has never had any symptoms similar. Was initially waking him up at night. Hurts more to lay on right side.   Has not been waking him up at night the last few days - so feels it may be improving.     No shortness of breath. No cough.    Smokes e-cigarettes.       Review of Systems   Constitutional, HEENT, cardiovascular, pulmonary, gi and gu systems are negative, except as otherwise noted.      Objective    /88 (BP Location: Right arm, Patient Position: Chair, Cuff Size: Adult Large)   Pulse 78   Temp 97.9  F (36.6  C) (Oral)   Ht 1.725 m (5' 7.91\")   Wt 102.5 kg (226 lb 0.8 oz)   SpO2 98%   BMI 34.46 kg/m    Body mass index is 34.46 kg/m .  Physical Exam   GENERAL: healthy, alert and no distress  NECK: no adenopathy, no asymmetry, masses, or scars and thyroid normal to palpation  RESP: lungs clear to auscultation - no rales, rhonchi or wheezes  CV: regular rate and rhythm, normal S1 S2, no S3 or S4, no murmur, click or rub, no peripheral edema and peripheral pulses strong  ABDOMEN: soft, nontender, no hepatosplenomegaly, no masses and bowel sounds normal  MS: no gross musculoskeletal defects noted, no edema    CXR - Reviewed and interpreted by me Normal- no infiltrates, effusions, pneumothoraces, cardiomegaly or masses  EKG - Reviewed and interpreted by me appears normal, NSR, normal axis, normal intervals, no acute ST/T changes c/w ischemia, no LVH by voltage criteria, unchanged from previous tracings            "

## 2021-08-26 ENCOUNTER — OFFICE VISIT (OUTPATIENT)
Dept: FAMILY MEDICINE | Facility: CLINIC | Age: 36
End: 2021-08-26
Payer: COMMERCIAL

## 2021-08-26 VITALS
OXYGEN SATURATION: 98 % | RESPIRATION RATE: 12 BRPM | SYSTOLIC BLOOD PRESSURE: 130 MMHG | DIASTOLIC BLOOD PRESSURE: 80 MMHG | HEART RATE: 83 BPM | TEMPERATURE: 98.1 F

## 2021-08-26 DIAGNOSIS — M76.61 ACHILLES TENDINITIS OF RIGHT LOWER EXTREMITY: Primary | ICD-10-CM

## 2021-08-26 PROCEDURE — 99213 OFFICE O/P EST LOW 20 MIN: CPT | Performed by: INTERNAL MEDICINE

## 2021-08-26 RX ORDER — PREDNISONE 20 MG/1
TABLET ORAL
Qty: 10 TABLET | Refills: 0 | Status: SHIPPED | OUTPATIENT
Start: 2021-08-26 | End: 2022-09-26

## 2021-08-26 NOTE — PROGRESS NOTES
Assessment & Plan     1.  Achilles tendinitis/enthescopathy.  He has history of gout will be unusual to have gout at the tendon Achilles insertion site over the calcaneum.  Since there is no history of trauma or clinical evidence of tendon rupture or evidence of cellulitis immunity treating with prednisone like I would for gout.  40 mg prednisone daily for 3 days followed by 20 mg daily for 4 days.  Advised to get back to me at least by early next week if he does not show response.      Ubaldo Steinberg MD  [facility] MAGDALENO Rendon is a 35 year old who presents for the following health issues     History of Present Illness       He eats 0-1 servings of fruits and vegetables daily.He consumes 1 sweetened beverage(s) daily.He exercises with enough effort to increase his heart rate 60 or more minutes per day.  He exercises with enough effort to increase his heart rate 6 days per week.   He is taking medications regularly.       Pt has had pain in back of ankle x 3 days, started as a sharp pain at certain angles, worked on it yesterday and now cannot walk or put any pressure on it.      The patient comes in stating that 3 days ago he started feeling some pain in the back of his right heel and over the next 3 days it progressively got worse to a point that today he could not bear to put weight on his right leg and can go to work.  Flexion of the right foot is painful.  The pain is localized posterior aspect of the right heel pointing insertion site of the tendon Achilles.  He has severe tenderness to touch there and feels that there is little bit of swelling and perhaps redness.  No fever or chills.  No history of trauma.  Does have history of recurrent gout and had a prescription of allopurinol 3 mg which she has not been taking.  In the past also had been prescribed colchicine.    Review of Systems   Constitutional, HEENT, cardiovascular, pulmonary, gi and gu systems are negative,  except as otherwise noted.      Objective    There were no vitals taken for this visit.  There is no height or weight on file to calculate BMI.  Physical Exam   GENERAL: healthy, alert and no distress  MS: Right foot examination reveals no tenderness over the MTP joints of the foot.  No redness there.  The plantar surface of the foot shows no tenderness including the heel however at the insertion of the Achilles tendon on the calcaneum there is significant tenderness and I perceive little bit of redness and even swelling.  The skin is intact without any lesions.  Palpation of the tendon Achilles at the insertion site is very tender.  Flexion and stretching of the tendon Achilles is quite painful.

## 2021-09-03 DIAGNOSIS — M1A.09X0 IDIOPATHIC CHRONIC GOUT OF MULTIPLE SITES WITHOUT TOPHUS: ICD-10-CM

## 2021-09-07 RX ORDER — COLCHICINE 0.6 MG/1
TABLET ORAL
Qty: 90 TABLET | Refills: 0 | Status: SHIPPED | OUTPATIENT
Start: 2021-09-07 | End: 2022-09-26

## 2021-09-07 NOTE — TELEPHONE ENCOUNTER
This medication hasn't been refilled in over 1 year.     Routing to provider to advise.  Shabnam Horne BSN, RN

## 2021-10-03 ENCOUNTER — HEALTH MAINTENANCE LETTER (OUTPATIENT)
Age: 36
End: 2021-10-03

## 2022-05-15 ENCOUNTER — HEALTH MAINTENANCE LETTER (OUTPATIENT)
Age: 37
End: 2022-05-15

## 2022-09-10 ENCOUNTER — HEALTH MAINTENANCE LETTER (OUTPATIENT)
Age: 37
End: 2022-09-10

## 2022-09-26 ENCOUNTER — E-VISIT (OUTPATIENT)
Dept: FAMILY MEDICINE | Facility: CLINIC | Age: 37
End: 2022-09-26
Payer: COMMERCIAL

## 2022-09-26 DIAGNOSIS — M1A.09X0 IDIOPATHIC CHRONIC GOUT OF MULTIPLE SITES WITHOUT TOPHUS: ICD-10-CM

## 2022-09-26 DIAGNOSIS — M76.61 ACHILLES TENDINITIS OF RIGHT LOWER EXTREMITY: ICD-10-CM

## 2022-09-26 PROCEDURE — 99421 OL DIG E/M SVC 5-10 MIN: CPT | Performed by: FAMILY MEDICINE

## 2022-09-26 RX ORDER — PREDNISONE 20 MG/1
TABLET ORAL
Qty: 10 TABLET | Refills: 0 | Status: CANCELLED | OUTPATIENT
Start: 2022-09-26

## 2022-09-26 RX ORDER — ALLOPURINOL 300 MG/1
TABLET ORAL
Qty: 90 TABLET | Refills: 0 | Status: CANCELLED | OUTPATIENT
Start: 2022-09-26

## 2022-09-26 RX ORDER — COLCHICINE 0.6 MG/1
TABLET ORAL
Qty: 90 TABLET | Refills: 1 | Status: SHIPPED | OUTPATIENT
Start: 2022-09-26 | End: 2023-08-30

## 2022-09-26 RX ORDER — ALLOPURINOL 300 MG/1
TABLET ORAL
Qty: 90 TABLET | Refills: 1 | Status: SHIPPED | OUTPATIENT
Start: 2022-09-26 | End: 2023-08-30

## 2022-09-26 RX ORDER — COLCHICINE 0.6 MG/1
TABLET ORAL
Qty: 90 TABLET | Refills: 1 | Status: SHIPPED | OUTPATIENT
Start: 2022-09-26 | End: 2022-09-26

## 2022-09-26 RX ORDER — COLCHICINE 0.6 MG/1
TABLET ORAL
Qty: 90 TABLET | Refills: 0 | Status: CANCELLED | OUTPATIENT
Start: 2022-09-26

## 2022-09-26 RX ORDER — PREDNISONE 20 MG/1
TABLET ORAL
Qty: 20 TABLET | Refills: 0 | Status: SHIPPED | OUTPATIENT
Start: 2022-09-26 | End: 2024-08-09

## 2022-09-26 NOTE — TELEPHONE ENCOUNTER
Reason for Call:  Same Day Appointment, Requested Provider:  Dr. Watkins    PCP: Jack Watkins    Reason for visit: Gout flare up    Duration of symptoms: Started yesterday    Have you been treated for this in the past? Yes    Additional comments: Would like to be seen this week, pt didn't sleep well last night    Can we leave a detailed message on this number? YES    Phone number patient can be reached at: Home number on file [phone] (home)    Best Time: anytime    Call taken on 9/26/2022 at 1:13 PM by Sofia Monte

## 2022-09-26 NOTE — LETTER
October 3, 2022          Garry Dupree  [address] [place] [id number]            Dear Garry Dupree,      At [facility] we care about your health and are committed to providing quality patient care. Regular appointments are a vital part of the care and management of your health and can help prevent many of the complications that can occur.      It has come to our attention that you are due for a Medication check.  Please call [facility] at [phone] soon to schedule your follow up appointment.    If you have transferred care to another clinic please call to inform us so that we do not continue to send you reminder letters.      Sincerely,      [facility] Care Team

## 2022-09-26 NOTE — TELEPHONE ENCOUNTER
"Incoming call from pt reporting gout flare and requesting medication.  Last office visit > 12 months ago.  No annual physical/wellness exams on file at [facility].  Pt states he d/c'd allopurinol about 1.5 year ago, but would like to restart it. He is also requesting colchicine and prednisone for the current flare up.    RN advised pt to submit an Evisit. Discussed how to submit an Evisit and provided pt with [facility] if he needs further assistance.      Requested Prescriptions   Pending Prescriptions Disp Refills     allopurinol (ZYLOPRIM) 300 MG tablet 90 tablet 0     Sig: TAKE 1 TABLET(300 MG) BY MOUTH DAILY FOR GOUT PREVENTION       Gout Agents Protocol Failed - 9/26/2022  1:25 PM        Failed - CBC on file in past 12 months     Recent Labs   Lab Test 09/26/18  1235   WBC 7.6   RBC 5.18   HGB 15.9   HCT 45.7                    Failed - ALT on file in past 12 months     Recent Labs   Lab Test 09/26/18  1235   ALT 48             Failed - Has Uric Acid on file in past 12 months and value is less than 6     Recent Labs   Lab Test 09/26/18  1235   URIC 7.2     If level is 6mg/dL or greater, ok to refill one time and refer to provider.           Failed - Recent (12 mo) or future (30 days) visit within the authorizing provider's specialty     Patient has had an office visit with the authorizing provider or a provider within the authorizing providers department within the previous 12 mos or has a future within next 30 days. See \"Patient Info\" tab in inbasket, or \"Choose Columns\" in Meds & Orders section of the refill encounter.              Failed - Normal serum creatinine on file in the past 12 months     Recent Labs   Lab Test 09/26/18  1235   CR 0.78       Ok to refill medication if creatinine is low          Passed - Medication is active on med list        Passed - Patient is age 18 or older           colchicine (COLCRYS) 0.6 MG tablet 90 tablet 0     Sig: TAKE 1 TABLET(0.6 MG) BY MOUTH DAILY " "FOR GOUT PREVENTION       Gout Agents Protocol Failed - 9/26/2022  1:25 PM        Failed - CBC on file in past 12 months     Recent Labs   Lab Test 09/26/18  1235   WBC 7.6   RBC 5.18   HGB 15.9   HCT 45.7                    Failed - ALT on file in past 12 months     Recent Labs   Lab Test 09/26/18  1235   ALT 48             Failed - Has Uric Acid on file in past 12 months and value is less than 6     Recent Labs   Lab Test 09/26/18  1235   URIC 7.2     If level is 6mg/dL or greater, ok to refill one time and refer to provider.           Failed - Recent (12 mo) or future (30 days) visit within the authorizing provider's specialty     Patient has had an office visit with the authorizing provider or a provider within the authorizing providers department within the previous 12 mos or has a future within next 30 days. See \"Patient Info\" tab in inbasket, or \"Choose Columns\" in Meds & Orders section of the refill encounter.              Failed - Normal serum creatinine on file in the past 12 months     Recent Labs   Lab Test 09/26/18  1235   CR 0.78       Ok to refill medication if creatinine is low          Passed - Medication is active on med list        Passed - Patient is age 18 or older           predniSONE (DELTASONE) 20 MG tablet 10 tablet 0     Sig: Two tablets daily for 3 days than one daily for 4 days.       There is no refill protocol information for this order          "

## 2022-09-27 NOTE — TELEPHONE ENCOUNTER
Called and left a voicemail message to return our call to schedule appointment.  Harini Gordillo MA  [facility]  2nd Floor  Primary Care

## 2022-09-28 NOTE — TELEPHONE ENCOUNTER
Called and left a voicemail message to return our call to schedule appointment.        Postponing message for a few days to give patient time to call back.

## 2022-10-03 NOTE — TELEPHONE ENCOUNTER
Provider-please advise on next steps. TCs have been unable to reach patient and he has not called back and made an appt.

## 2022-12-11 ENCOUNTER — OFFICE VISIT (OUTPATIENT)
Dept: URGENT CARE | Facility: URGENT CARE | Age: 37
End: 2022-12-11
Payer: COMMERCIAL

## 2022-12-11 ENCOUNTER — ANCILLARY PROCEDURE (OUTPATIENT)
Dept: GENERAL RADIOLOGY | Facility: CLINIC | Age: 37
End: 2022-12-11
Attending: FAMILY MEDICINE
Payer: COMMERCIAL

## 2022-12-11 VITALS
TEMPERATURE: 96.7 F | SYSTOLIC BLOOD PRESSURE: 138 MMHG | DIASTOLIC BLOOD PRESSURE: 94 MMHG | HEART RATE: 92 BPM | OXYGEN SATURATION: 96 %

## 2022-12-11 DIAGNOSIS — M1A.09X0 IDIOPATHIC CHRONIC GOUT OF MULTIPLE SITES WITHOUT TOPHUS: Primary | ICD-10-CM

## 2022-12-11 DIAGNOSIS — M25.572 PAIN IN JOINT INVOLVING ANKLE AND FOOT, LEFT: ICD-10-CM

## 2022-12-11 DIAGNOSIS — M1A.09X0 IDIOPATHIC CHRONIC GOUT OF MULTIPLE SITES WITHOUT TOPHUS: ICD-10-CM

## 2022-12-11 PROCEDURE — 99214 OFFICE O/P EST MOD 30 MIN: CPT | Performed by: FAMILY MEDICINE

## 2022-12-11 PROCEDURE — 73610 X-RAY EXAM OF ANKLE: CPT | Mod: TC | Performed by: RADIOLOGY

## 2022-12-11 NOTE — PROGRESS NOTES
Chief complaint: ankle pain left    Started feeling sore 2 days ago   Patient did work a 10 hour day on his feet the day before  Tried to walk it off yesterday  But then last night started to hurt much worse   3 am was really throbbing    Problem list, Medication list, Allergies, and Medical/Social/Surgical histories reviewed in [facility] and updated as appropriate.      ROS:  Musculoskeletal:  See HPI.  Constitutional, HEENT, cardiovascular, pulmonary, skin systems are negative, except as otherwise noted.      OBJECTIVE:  Blood pressure (!) 138/94, pulse 92, temperature (!) 96.7  F (35.9  C), temperature source Tympanic, SpO2 96 %.  Patient is alert and NAD.  EYES: conjunctiva clear  Ankle Exam (left):  Inspection:no swelling seen  Palpation:tender over anterior talar area  no tenderness on navicular bone or 5th metatarsal. No proximal fibular tenderness. No calf tenderness. Achilles tendon is intact  Cap refill intact.    Good doralis pedis.  Neurovascularly Intact Distally.     XR to my review no fracture no foreign body official radiology pending.     ASSESSMENT:    ICD-10-CM    1. Idiopathic chronic gout of multiple sites without tophus  M1A.09X0 XR Ankle Left G/E 3 Views      2. Pain in joint involving ankle and foot, left  M25.572 XR Ankle Left G/E 3 Views     Orthopedic  Referral            PLAN:    ASO brace given.  Weightbearing only as tolerated.   Patient has crutches  Suspect ligamentous injury  Work note with restrictions given and referred to orthopedics for follow up   OTC pain control as needed. Proper dosing and contraindications discussed.  Patient aware to avoid NSAIDs if with kidney disease or ulcers.   Ice, elevate, slowly increase activity level with active range of motion exercises encouraged.  Follow up encouraged if no relief despite treatment plan.   Patient voiced understanding.    Gout - patient on allopurinol and colchicine preventative - advised patient to follow up with primary  care provider   Currently no erythema no warmth not really tender to suggest gout however if more gout signs or symptoms develop advised to increase colchicine to bid and follow up with primary care provider  Alarm signs or symptoms discussed, if present recommend go to ER           Chasity Lynch MD

## 2022-12-11 NOTE — LETTER
[facility] [address] [address]  Phone: [phone]    December 11, 2022        Garry Dupree  [address] [facility] [address]          To whom it may concern:    RE: Garry K Leia    Patient was seen and treated today at our clinic.  Recommend weight bearing as tolerated and use of crutches as needed.  Recommend mostly sitting down work only until re-evaluated.     Please contact me for questions or concerns.      Sincerely,        Chasity Lynch MD

## 2022-12-14 ENCOUNTER — OFFICE VISIT (OUTPATIENT)
Dept: PODIATRY | Facility: CLINIC | Age: 37
End: 2022-12-14
Attending: FAMILY MEDICINE
Payer: COMMERCIAL

## 2022-12-14 VITALS — SYSTOLIC BLOOD PRESSURE: 130 MMHG | HEART RATE: 80 BPM | DIASTOLIC BLOOD PRESSURE: 98 MMHG

## 2022-12-14 DIAGNOSIS — M21.6X1 PRONATION OF BOTH FEET: ICD-10-CM

## 2022-12-14 DIAGNOSIS — M21.6X2 PRONATION OF BOTH FEET: ICD-10-CM

## 2022-12-14 DIAGNOSIS — M25.572 PAIN IN JOINT INVOLVING ANKLE AND FOOT, LEFT: ICD-10-CM

## 2022-12-14 DIAGNOSIS — M1A.09X0 IDIOPATHIC CHRONIC GOUT OF MULTIPLE SITES WITHOUT TOPHUS: Primary | ICD-10-CM

## 2022-12-14 LAB — URATE SERPL-MCNC: 6 MG/DL (ref 3.5–7.2)

## 2022-12-14 PROCEDURE — 36415 COLL VENOUS BLD VENIPUNCTURE: CPT | Performed by: PODIATRIST

## 2022-12-14 PROCEDURE — 84550 ASSAY OF BLOOD/URIC ACID: CPT | Performed by: PODIATRIST

## 2022-12-14 PROCEDURE — 99204 OFFICE O/P NEW MOD 45 MIN: CPT | Performed by: PODIATRIST

## 2022-12-14 RX ORDER — METHYLPREDNISOLONE 4 MG
4 TABLET, DOSE PACK ORAL SEE ADMIN INSTRUCTIONS
Qty: 21 EACH | Refills: 0 | Status: SHIPPED | OUTPATIENT
Start: 2022-12-14 | End: 2024-08-09

## 2022-12-14 NOTE — PATIENT INSTRUCTIONS
We wish you continued good healing. If you have any questions or concerns, please do not hesitate to contact us at  [phone]    [facility] (secure e-mail communication and access to your chart) to send a message or to make an appointment.    Please remember to call and schedule a follow up appointment if one was recommended at your earliest convenience.     PODIATRY CLINIC HOURS  TELEPHONE NUMBER    Dr. Forrest RUSSELLPMOLINA [facility]        Clinics:  [facility]   [place]  Tuesday 1PM-6PM  [place]  Wednesday 745AM-330PM  [place]  Thursday/Friday 745AM-230PM       DOMENIC APPOINTMENTS  [phone]    [place] APPOINTMENTS  [phone]        If you need a medication refill, please contact us you may need lab work and/or a follow up visit prior to your refill (i.e. Antifungal medications).  If MRI needed please call Imaging at [phone]   HOW DO I GET MY KNEE SCOOTER? Knee scooters can be picked up at ANY Medical Supply stores with your knee scooter Prescription.  OR  Bring your signed prescription to an [facility] Medical [facility] showroom.

## 2022-12-14 NOTE — LETTER
12/14/2022         RE: Garry Dupree  [address]  [place] [address]        Dear Colleague,    Thank you for referring your patient, Garry Dupree, to the [facility]. Please see a copy of my visit note below.    S: Patient seen today in consult from Dr. Lynch and complains of left rear foot pain.  Started 4 months ago.  Had swelling.  Today just started getting better.  Patient works on his feet.  Work 10 hours a day when this started.  Has had gout for years.  He states 3 weeks before this happened had gout attack in left big toe.  This gout attack resolved and recently started taking colchicine and allopurinol.  He has been off these medications for a few years because he wanted to control his gout naturally and did not want to take medications.  Also complains of bilateral foot pain.  Points to medial arch.  Has had this for years.  Bothersome at work.  At home when cooking a lot becomes bothersome.  He is wondering what would be good for his feet as he understands they are flat.  Denies erythema ecchymosis weakness or increased deformity    ROS:  see above       No Known Allergies    Current Outpatient Medications   Medication Sig Dispense Refill     methylPREDNISolone (MEDROL DOSEPAK) 4 MG tablet therapy pack Take 1 tablet (4 mg) by mouth See Admin Instructions follow package directions 21 each 0     allopurinol (ZYLOPRIM) 300 MG tablet TAKE 1 TABLET(300 MG) BY MOUTH DAILY FOR GOUT PREVENTION 90 tablet 1     colchicine (COLCRYS) 0.6 MG tablet TAKE 1 TABLET(0.6 MG) BY MOUTH DAILY FOR GOUT PREVENTION 90 tablet 1     predniSONE (DELTASONE) 20 MG tablet Take 3 tabs by mouth daily x 3 days, then 2 tabs daily x 3 days, then 1 tab daily x 3 days, then 1/2 tab daily x 3 days. (Patient not taking: Reported on 12/11/2022) 20 tablet 0       Patient Active Problem List   Diagnosis     CARDIOVASCULAR SCREENING; LDL GOAL LESS THAN 160     Idiopathic chronic gout of multiple sites  without tophus     Vaccination not carried out because of patient refusal     Obesity, Class I, BMI 30-34.9       Past Medical History:   Diagnosis Date     Gout        No past surgical history on file.    Family History   Problem Relation Age of Onset     Arthritis Father        Social History     Tobacco Use     Smoking status: Every Day     Types: Other     Smokeless tobacco: Never   Substance Use Topics     Alcohol use: Yes     Alcohol/week: 11.7 standard drinks     Types: 14 Standard drinks or equivalent per week     Comment: socially         Exam:    Vitals: BP (!) 130/98   Pulse 80   BMI: There is no height or weight on file to calculate BMI.  Height: Data Unavailable    Constitutional/ general:  Pt is in no apparent distress, appears well-nourished.  Cooperative with history and physical exam.     Psych:  The patient answered questions appropriately.  Normal affect.  Seems to have reasonable expectations, in terms of treatment.     Lungs:  Non labored breathing, non labored speech. No cough.  No audible wheezing. Even, quiet breathing.       Vascular:  positive pedal pulses bilaterally for both the DP and PT arteries.  CFT < 3 sec.  negative ankle edema.  positive pedal hair growth.    Neuro:  Alert and oriented x 3. Coordinated gait.  Light touch sensation is intact     Derm: Normal texture and turgor.  No erythema, ecchymosis, or cyanosis.      Musculoskeletal:    Lower extremity muscle strength is normal.  Patient is ambulatory without an assistive device or brace.  No gross deformities.  Normal ROM all fore foot and rearfoot joints.  No equinus.  With weightbearing patient has bilateral pronation.  Left foot slight edema noted.  No pain on the tarsometatarsal joint or distally.  No pain on the dorsum of the tarsal bones.  No pain on medial or lateral malleoli.  No calcaneal compression pain.  No pain with palpation or stressing any tendons.  Slight discomfort around ankle especially lateral.  Edema  noted.  No crepitus.  No erythema or ecchymosis.    Radiographic Exam:    EXAM: XR ANKLE LEFT G/E 3 VIEWS  LOCATION: [facility]  DATE/TIME: 12/11/2022 2:24 PM     INDICATION:  Idiopathic chronic gout of multiple sites without tophus, Pain in joint involving ankle and foot, left  COMPARISON: None.                                                                      IMPRESSION: Normal joint spaces and alignment. No fracture. No erosive or degenerative arthritic changes.       Latest Reference Range & Units 09/26/18 12:35   Uric Acid 3.5 - 7.2 mg/dL 7.2           A:    Left ankle gout attack   Bilateral pronation causing pain    P:  X-rays personally reviewed.  Reviewed recent labs.  Reviewed recent notes.  Discussed with patient ankle second most common place for gout attack after first metatarsal phalangeal joint.  Prescription written for Medrol Dosepak to help resolve this.  Patient has been on steroids before and tolerated these well.  We will send to lab to check uric acid.  Discussed with patient that he may need to take oral medications to reduce his uric acid in his bloodstream to prevent gout attacks in the future and other gout associated problems such as gouty arthritis.  He will follow-up with his primary care regarding this.  Discussed how pronation can cause fatigue.  Discussed importance of good stiff supportive shoes at all times both inside and outside and I made suggestions..  RX for custom orthotics.  Discussed importance of wearing these in a good shoe at all times to prevent future problems.   RETURN TO CLINIC PRN.  Thank you for allowing me participate in the care of this patient.        Forrest Vasquez DPM, FACFAS                 Again, thank you for allowing me to participate in the care of your patient.        Sincerely,        Forrest Vasquez DPM

## 2022-12-14 NOTE — PROGRESS NOTES
S: Patient seen today in consult from Dr. Lynch and complains of left rear foot pain.  Started 4 months ago.  Had swelling.  Today just started getting better.  Patient works on his feet.  Work 10 hours a day when this started.  Has had gout for years.  He states 3 weeks before this happened had gout attack in left big toe.  This gout attack resolved and recently started taking colchicine and allopurinol.  He has been off these medications for a few years because he wanted to control his gout naturally and did not want to take medications.  Also complains of bilateral foot pain.  Points to medial arch.  Has had this for years.  Bothersome at work.  At home when cooking a lot becomes bothersome.  He is wondering what would be good for his feet as he understands they are flat.  Denies erythema ecchymosis weakness or increased deformity    ROS:  see above       No Known Allergies    Current Outpatient Medications   Medication Sig Dispense Refill     methylPREDNISolone (MEDROL DOSEPAK) 4 MG tablet therapy pack Take 1 tablet (4 mg) by mouth See Admin Instructions follow package directions 21 each 0     allopurinol (ZYLOPRIM) 300 MG tablet TAKE 1 TABLET(300 MG) BY MOUTH DAILY FOR GOUT PREVENTION 90 tablet 1     colchicine (COLCRYS) 0.6 MG tablet TAKE 1 TABLET(0.6 MG) BY MOUTH DAILY FOR GOUT PREVENTION 90 tablet 1     predniSONE (DELTASONE) 20 MG tablet Take 3 tabs by mouth daily x 3 days, then 2 tabs daily x 3 days, then 1 tab daily x 3 days, then 1/2 tab daily x 3 days. (Patient not taking: Reported on 12/11/2022) 20 tablet 0       Patient Active Problem List   Diagnosis     CARDIOVASCULAR SCREENING; LDL GOAL LESS THAN 160     Idiopathic chronic gout of multiple sites without tophus     Vaccination not carried out because of patient refusal     Obesity, Class I, BMI 30-34.9       Past Medical History:   Diagnosis Date     Gout        No past surgical history on file.    Family History   Problem Relation Age of Onset      Arthritis Father        Social History     Tobacco Use     Smoking status: Every Day     Types: Other     Smokeless tobacco: Never   Substance Use Topics     Alcohol use: Yes     Alcohol/week: 11.7 standard drinks     Types: 14 Standard drinks or equivalent per week     Comment: socially         Exam:    Vitals: BP (!) 130/98   Pulse 80   BMI: There is no height or weight on file to calculate BMI.  Height: Data Unavailable    Constitutional/ general:  Pt is in no apparent distress, appears well-nourished.  Cooperative with history and physical exam.     Psych:  The patient answered questions appropriately.  Normal affect.  Seems to have reasonable expectations, in terms of treatment.     Lungs:  Non labored breathing, non labored speech. No cough.  No audible wheezing. Even, quiet breathing.       Vascular:  positive pedal pulses bilaterally for both the DP and PT arteries.  CFT < 3 sec.  negative ankle edema.  positive pedal hair growth.    Neuro:  Alert and oriented x 3. Coordinated gait.  Light touch sensation is intact     Derm: Normal texture and turgor.  No erythema, ecchymosis, or cyanosis.      Musculoskeletal:    Lower extremity muscle strength is normal.  Patient is ambulatory without an assistive device or brace.  No gross deformities.  Normal ROM all fore foot and rearfoot joints.  No equinus.  With weightbearing patient has bilateral pronation.  Left foot slight edema noted.  No pain on the tarsometatarsal joint or distally.  No pain on the dorsum of the tarsal bones.  No pain on medial or lateral malleoli.  No calcaneal compression pain.  No pain with palpation or stressing any tendons.  Slight discomfort around ankle especially lateral.  Edema noted.  No crepitus.  No erythema or ecchymosis.    Radiographic Exam:    EXAM: XR ANKLE LEFT G/E 3 VIEWS  LOCATION: [facility]  DATE/TIME: 12/11/2022 2:24 PM     INDICATION:  Idiopathic chronic gout of multiple sites without tophus,  Pain in joint involving ankle and foot, left  COMPARISON: None.                                                                      IMPRESSION: Normal joint spaces and alignment. No fracture. No erosive or degenerative arthritic changes.       Latest Reference Range & Units 09/26/18 12:35   Uric Acid 3.5 - 7.2 mg/dL 7.2           A:    Left ankle gout attack   Bilateral pronation causing pain    P:  X-rays personally reviewed.  Reviewed recent labs.  Reviewed recent notes.  Discussed with patient ankle second most common place for gout attack after first metatarsal phalangeal joint.  Prescription written for Medrol Dosepak to help resolve this.  Patient has been on steroids before and tolerated these well.  We will send to lab to check uric acid.  Discussed with patient that he may need to take oral medications to reduce his uric acid in his bloodstream to prevent gout attacks in the future and other gout associated problems such as gouty arthritis.  He will follow-up with his primary care regarding this.  Discussed how pronation can cause fatigue.  Discussed importance of good stiff supportive shoes at all times both inside and outside and I made suggestions..  RX for custom orthotics.  Discussed importance of wearing these in a good shoe at all times to prevent future problems.   RETURN TO CLINIC PRN.  Thank you for allowing me participate in the care of this patient.        Forrest Vasquez DPM, FACFAS

## 2023-06-03 ENCOUNTER — HEALTH MAINTENANCE LETTER (OUTPATIENT)
Age: 38
End: 2023-06-03

## 2023-08-17 ENCOUNTER — ANCILLARY PROCEDURE (OUTPATIENT)
Dept: GENERAL RADIOLOGY | Facility: CLINIC | Age: 38
End: 2023-08-17
Payer: COMMERCIAL

## 2023-08-17 ENCOUNTER — OFFICE VISIT (OUTPATIENT)
Dept: URGENT CARE | Facility: URGENT CARE | Age: 38
End: 2023-08-17
Payer: COMMERCIAL

## 2023-08-17 VITALS
TEMPERATURE: 97.8 F | DIASTOLIC BLOOD PRESSURE: 102 MMHG | RESPIRATION RATE: 16 BRPM | BODY MASS INDEX: 37.36 KG/M2 | HEART RATE: 82 BPM | SYSTOLIC BLOOD PRESSURE: 136 MMHG | WEIGHT: 245.1 LBS | OXYGEN SATURATION: 97 %

## 2023-08-17 DIAGNOSIS — M79.672 LEFT FOOT PAIN: Primary | ICD-10-CM

## 2023-08-17 PROCEDURE — 99213 OFFICE O/P EST LOW 20 MIN: CPT

## 2023-08-17 PROCEDURE — 73630 X-RAY EXAM OF FOOT: CPT | Mod: TC | Performed by: RADIOLOGY

## 2023-08-17 ASSESSMENT — PAIN SCALES - GENERAL: PAINLEVEL: NO PAIN (0)

## 2023-08-17 NOTE — PROGRESS NOTES
ASSESSMENT:  (M79.672) Left foot pain  (primary encounter diagnosis)  Plan: XR Foot Left G/E 3 Views    PLAN:  Informed the patient that the left foot x-ray does not show any fracture or dislocation per the radiologist report.  We discussed that the radiologist did note Achilles insertional and plantar fascial origin calcaneal enthesophytes.  Discussed the need to rest and use ice for the pain.  We also discussed using Tylenol and or ibuprofen as needed for the pain with the maximum dose of Tylenol being 4000 mg in a 24-hour period of time and to take ibuprofen with food avoid upset stomach.  Informed the patient to return to clinic with any new or worsening symptoms.  Patient acknowledged his understanding of the above plan.    The use of Dragon/PowerMic dictation services may have been used to construct the content in this note; any grammatical or spelling errors are non-intentional. Please contact the author of this note directly if you are in need of any clarification.      Gabriel Wright, JOHANNE CNP    SUBJECTIVE:  Garry Dupree is a 37 year old male who presents today for left foot pain.   Injury occurred 2 days ago.    The mechanism of injury includes: unknown  Rated as: 0 on a scale of 1-10.  Walking on it 8-9/10  Described as: sharp  What makes it worse: walking on it  What makes it better:rest  Associated Signs/ Symptoms: none  Treatment: walking boot  History of recurrent foot injuries: no  Symptoms are Unchanged  Denies any numbness/tingling.    ROS:  Negative except noted above.       OBJECTIVE:  Blood pressure (!) 136/102, pulse 82, temperature 97.8  F (36.6  C), temperature source Tympanic, resp. rate 16, weight 111.2 kg (245 lb 1.6 oz), SpO2 97 %.  Patient is alert and not in apparent distress.  Left foot-   Inspection:   No visible ecchymosis or edema.  No obvious deformity noted in foot and ankle.  Palpation: Tender over the lateral dorsal portion of the foot.  Good doralis pedis and  posterior tibial pulses  Neurovascularly Intact Distally.   ROM:  Full range of motion with dorsiflexion, plantarflexion, inversion and eversion.  Gait normal, however patient is wearing a walking boot as he indicates this helps with the pain.    X-Ray:   Left foot x-ray does not show any fracture or dislocation per the radiologist report.  The radiologist did note Achilles insertional and plantar fascial origin calcaneal enthesophytes

## 2023-08-17 NOTE — PATIENT INSTRUCTIONS
Left foot x-ray does not show any fracture or dislocation per the radiologist report.  The radiologist did note Achilles insertional and plantar fascial origin calcaneal enthesophytes.  Rest and ice for pain.  Can use Tylenol and/or ibuprofen as needed for pain.  Maximum dose of Tylenol is 4000mg in a 24 hour period of time.  Take ibuprofen with food to avoid stomach upset.

## 2023-08-30 DIAGNOSIS — M1A.09X0 IDIOPATHIC CHRONIC GOUT OF MULTIPLE SITES WITHOUT TOPHUS: ICD-10-CM

## 2023-08-30 RX ORDER — COLCHICINE 0.6 MG/1
TABLET ORAL
Qty: 90 TABLET | Refills: 0 | Status: SHIPPED | OUTPATIENT
Start: 2023-08-30 | End: 2024-08-09

## 2023-08-30 RX ORDER — ALLOPURINOL 300 MG/1
TABLET ORAL
Qty: 90 TABLET | Refills: 0 | Status: SHIPPED | OUTPATIENT
Start: 2023-08-30 | End: 2024-08-09

## 2024-07-07 ENCOUNTER — HEALTH MAINTENANCE LETTER (OUTPATIENT)
Age: 39
End: 2024-07-07

## 2024-07-17 ENCOUNTER — OFFICE VISIT (OUTPATIENT)
Dept: URGENT CARE | Facility: URGENT CARE | Age: 39
End: 2024-07-17
Payer: COMMERCIAL

## 2024-07-17 VITALS
OXYGEN SATURATION: 96 % | DIASTOLIC BLOOD PRESSURE: 73 MMHG | HEART RATE: 80 BPM | TEMPERATURE: 98.1 F | WEIGHT: 253.3 LBS | SYSTOLIC BLOOD PRESSURE: 109 MMHG | RESPIRATION RATE: 16 BRPM | BODY MASS INDEX: 38.61 KG/M2

## 2024-07-17 DIAGNOSIS — M72.2 PLANTAR FASCIITIS: Primary | ICD-10-CM

## 2024-07-17 PROCEDURE — 99213 OFFICE O/P EST LOW 20 MIN: CPT | Performed by: STUDENT IN AN ORGANIZED HEALTH CARE EDUCATION/TRAINING PROGRAM

## 2024-07-17 RX ORDER — NAPROXEN 500 MG/1
500 TABLET ORAL 2 TIMES DAILY WITH MEALS
Qty: 20 TABLET | Refills: 0 | Status: SHIPPED | OUTPATIENT
Start: 2024-07-17 | End: 2024-07-27

## 2024-07-17 ASSESSMENT — PAIN SCALES - GENERAL: PAINLEVEL: EXTREME PAIN (9)

## 2024-07-17 NOTE — PROGRESS NOTES
ASSESSMENT & PLAN:   Diagnoses and all orders for this visit:  Plantar fasciitis  -     naproxen (NAPROSYN) 500 MG tablet; Take 1 tablet (500 mg) by mouth 2 times daily (with meals) for 10 days    Left foot pain x 2 days. Jumped off keg prior to symptoms, but declines xray as symptoms are most consistent with plantar fasciitis, likely secondary to wearing different shoes on day of symptom onset. Patient given handout with plantar fasciitis stretching. Will treat with naproxen x 10 days, stretching, rest, ice. Did discuss that if no improvement or symptoms are worsening, should return to clinic for an x-ray.    At the end of the encounter, I discussed results, diagnosis, medications. Discussed red flags for immediate return to clinic/ER, as well as indications for follow up if no improvement. Patient and/or caregiver understood and agreed to plan. Patient was stable for discharge.    There are no Patient Instructions on file for this visit.    No follow-ups on file.    ------------------------------------------------------------------------  SUBJECTIVE  History was obtained from patient.    Patient presents with:  Foot Injury: Pt jumped out of keg cooler on Monday, complains of left foot pain. No swelling/bruising.     HPI  Garry Dupree is a(n) 38 year old male presenting to urgent care for left foot pain x 2 days. He jumped off a keg, had minimal pain when he landed. Pain has persisted since then. Reports he wore different shoes that day as well. History of gout, but this feels different. Pain worse in the morning.     Review of Systems    Current Outpatient Medications   Medication Sig Dispense Refill    allopurinol (ZYLOPRIM) 300 MG tablet TAKE 1 TABLET(300 MG) BY MOUTH DAILY FOR GOUT PREVENTION. Please follow up in clinic for next refill. 90 tablet 0    naproxen (NAPROSYN) 500 MG tablet Take 1 tablet (500 mg) by mouth 2 times daily (with meals) for 10 days 20 tablet 0    colchicine (COLCRYS) 0.6 MG tablet  TAKE 1 TABLET(0.6 MG) BY MOUTH DAILY FOR GOUT PREVENTION. Please follow up in clinic for next refill. (Patient not taking: Reported on 7/17/2024) 90 tablet 0    methylPREDNISolone (MEDROL DOSEPAK) 4 MG tablet therapy pack Take 1 tablet (4 mg) by mouth See Admin Instructions follow package directions (Patient not taking: Reported on 8/17/2023) 21 each 0    predniSONE (DELTASONE) 20 MG tablet Take 3 tabs by mouth daily x 3 days, then 2 tabs daily x 3 days, then 1 tab daily x 3 days, then 1/2 tab daily x 3 days. (Patient not taking: Reported on 12/11/2022) 20 tablet 0     Problem List:  2017-11: Vaccination not carried out because of patient refusal  2017-11: Obesity, Class I, BMI 30-34.9  2014-03: CARDIOVASCULAR SCREENING; LDL GOAL LESS THAN 160  2014-03: Idiopathic chronic gout of multiple sites without tophus    No Known Allergies      OBJECTIVE  Vitals:    07/17/24 1009   BP: 109/73   BP Location: Left arm   Patient Position: Sitting   Cuff Size: Adult Large   Pulse: 80   Resp: 16   Temp: 98.1  F (36.7  C)   TempSrc: Tympanic   SpO2: 96%   Weight: 114.9 kg (253 lb 4.8 oz)     Physical Exam   GENERAL: healthy, alert, no acute distress.   PSYCH: mentation appears normal. Normal affect  MSK: left LE -no deformity, ecchymosis, edema, erythema. Minimal tenderness to palpation at dorsal aspect of proximal 3rd through 5th metatarsal. Tenderness localized to plantar fascia. Pain worse with dorsiflexion. Full ROM. Toe flexion and extension. Distal sensation intact. Capillary refill less than 1 second.      No results found for any visits on 07/17/24.

## 2024-08-06 DIAGNOSIS — M1A.09X0 IDIOPATHIC CHRONIC GOUT OF MULTIPLE SITES WITHOUT TOPHUS: ICD-10-CM

## 2024-08-06 RX ORDER — ALLOPURINOL 300 MG/1
TABLET ORAL
Qty: 90 TABLET | Refills: 0 | Status: CANCELLED | OUTPATIENT
Start: 2024-08-06

## 2024-08-06 RX ORDER — COLCHICINE 0.6 MG/1
TABLET ORAL
Qty: 90 TABLET | Refills: 0 | Status: CANCELLED | OUTPATIENT
Start: 2024-08-06

## 2024-08-06 NOTE — TELEPHONE ENCOUNTER
Clinic RN: Please contact patient because patient should have run out of this medication on 11/30/23. Confirm patient is taking this medication as prescribed. Document findings and route refill encounter to provider for approval or denial.  Masha Starks RN, BSN  [facility]

## 2024-08-06 NOTE — TELEPHONE ENCOUNTER
"Called patient re: med request below. Patient states that he's actually been on this medication consistently, just ran out of meds today. States he found 2 \"old prescriptions\" of these medications so he has been taking those.     Writer noted last prescription sent by Dr. Watkins mentioned he needed appt for any additional refills - relayed this to patient. Patient verbalized understanding. Patient states he is OK with virtual visit but declines in person visit and/or annual visit (states that he's never had a physical/annual visit and doesn't want to schedule this at this time). Virtual visit scheduled for this Friday, patient aware of appt time. Patient states he may schedule annual visit after VV this Friday. Writer did encourage him to do this for continuity of care. No further questions or concerns.    Ela Miranda RN, BSN  [facility]  "

## 2024-08-06 NOTE — TELEPHONE ENCOUNTER
Medication Question or Refill        What medication are you calling about (include dose and sig)?: colchicine (COLCRYS) 0.6 MG tablet   allopurinol (ZYLOPRIM) 300 MG tablet     Preferred Pharmacy:    [id number] - [place], [place] - [address] [facility] [address]  [facility] [address]  Phone: [phone] Fax: [phone]      Controlled Substance Agreement on file:   CSA -- Patient Level:    CSA: None found at the patient level.       Who prescribed the medication?: Jack Watkins MD     Do you need a refill? Yes    Patient offered an appointment? No    Do you have any questions or concerns?  No      Could we send this information to you in [facility] or would you prefer to receive a phone call?:   Patient would prefer a phone call   Okay to leave a detailed message?: Yes at Cell number on file:    Telephone Information:   Mobile [phone]

## 2024-08-09 ENCOUNTER — VIRTUAL VISIT (OUTPATIENT)
Dept: FAMILY MEDICINE | Facility: CLINIC | Age: 39
End: 2024-08-09
Payer: COMMERCIAL

## 2024-08-09 DIAGNOSIS — M1A.09X0 IDIOPATHIC CHRONIC GOUT OF MULTIPLE SITES WITHOUT TOPHUS: ICD-10-CM

## 2024-08-09 PROCEDURE — 99441 PR PHYSICIAN TELEPHONE EVALUATION 5-10 MIN: CPT | Performed by: FAMILY MEDICINE

## 2024-08-09 RX ORDER — COLCHICINE 0.6 MG/1
TABLET ORAL
Qty: 90 TABLET | Refills: 3 | Status: SHIPPED | OUTPATIENT
Start: 2024-08-09

## 2024-08-09 RX ORDER — ALLOPURINOL 300 MG/1
TABLET ORAL
Qty: 90 TABLET | Refills: 3 | Status: SHIPPED | OUTPATIENT
Start: 2024-08-09

## 2024-08-09 ASSESSMENT — ANXIETY QUESTIONNAIRES
GAD7 TOTAL SCORE: 20
6. BECOMING EASILY ANNOYED OR IRRITABLE: NEARLY EVERY DAY
GAD7 TOTAL SCORE: 20
1. FEELING NERVOUS, ANXIOUS, OR ON EDGE: NEARLY EVERY DAY
2. NOT BEING ABLE TO STOP OR CONTROL WORRYING: NEARLY EVERY DAY
5. BEING SO RESTLESS THAT IT IS HARD TO SIT STILL: NEARLY EVERY DAY
IF YOU CHECKED OFF ANY PROBLEMS ON THIS QUESTIONNAIRE, HOW DIFFICULT HAVE THESE PROBLEMS MADE IT FOR YOU TO DO YOUR WORK, TAKE CARE OF THINGS AT HOME, OR GET ALONG WITH OTHER PEOPLE: VERY DIFFICULT
3. WORRYING TOO MUCH ABOUT DIFFERENT THINGS: NEARLY EVERY DAY
7. FEELING AFRAID AS IF SOMETHING AWFUL MIGHT HAPPEN: MORE THAN HALF THE DAYS

## 2024-08-09 ASSESSMENT — ENCOUNTER SYMPTOMS: NERVOUS/ANXIOUS: 1

## 2024-08-09 ASSESSMENT — PATIENT HEALTH QUESTIONNAIRE - PHQ9: 5. POOR APPETITE OR OVEREATING: NEARLY EVERY DAY

## 2024-08-09 NOTE — PROGRESS NOTES
"Justice is a 38 year old who is being evaluated via a billable telephone visit.      If patient has telephone visit, have they been educated on video visit as preferred visit method and offered to change to video visit? yes        Instructions Relayed to Patient by Virtual Roomer:     Patient is active on walkby:   Relayed following to patient: \"It looks like you are active on walkby, are you able to join the visit this way? If not, do you need us to send you a link now or would you like your provider to send a link via text or email when they are ready to initiate the visit?\"      Patient Confirmed they will join visit via: Provider to call patient for telephone visit   Reminded patient to ensure they were logged on to virtual visit by arrival time listed.   Asked if patient has flexibility to initiate visit sooner than arrival time: patient stated yes, documented in appointment notes availability to initiate visit earlier than arrival time     If pediatric virtual visit, ensured pediatric patient along with parent/guardian will be present for video visit.     Patient offered the website [email or web address] and/or phone number to walkby Help line: [phone] What phone number would you like to be contacted at? [phone]  How would you like to obtain your AVS? Via [employer]  Originating Location (pt. Location): Home    Distant Location (provider location):  On-site        Subjective   Justice is a 38 year old, presenting for the following health issues:          8/9/2024     7:35 AM   Additional Questions   Roomed by Toribio DOCKERY     Garry Dupree is a 38 year old male following up on gout medications. Patient has been taking allopurinol and colchicine. Working well for patient. No recent significant gout flares.      Review of Systems  Constitutional, HEENT, cardiovascular, pulmonary, GI, , musculoskeletal, neuro, skin, endocrine and psych systems are negative, except as otherwise noted.    "   Objective           Vitals:  No vitals were obtained today due to virtual visit.    Physical Exam   General: Alert and no distress //Respiratory: No audible wheeze, cough, or shortness of breath // Psychiatric:  Appropriate affect, tone, and pace of words      A/P:  (M1A.09X0) Idiopathic chronic gout of multiple sites without tophus  Comment:   Plan: allopurinol (ZYLOPRIM) 300 MG tablet,         colchicine (COLCRYS) 0.6 MG tablet, Uric acid,         Comprehensive metabolic panel (BMP + Alb, Alk         Phos, ALT, AST, Total. Bili, TP)        Continue with current medications. Recheck uric acid level and adjust if needed. Monitor renal and hepatic function.          Phone call duration: 9 minutes  Signed Electronically by: Jack Watkins MD, MD    Answers submitted by the patient for this visit:  General Questionnaire (Submitted on 8/7/2024)  Chief Complaint: Chronic problems general questions HPI Form  What is the reason for your visit today? : Gout  How many servings of fruits and vegetables do you eat daily?: 2-3  How many minutes a day do you exercise enough to make your heart beat faster?: 10 to 19  How many days a week do you exercise enough to make your heart beat faster?: 3 or less  How many days per week do you miss taking your medication?: 2

## 2024-09-10 ENCOUNTER — NURSE TRIAGE (OUTPATIENT)
Dept: FAMILY MEDICINE | Facility: CLINIC | Age: 39
End: 2024-09-10
Payer: COMMERCIAL

## 2024-09-10 NOTE — TELEPHONE ENCOUNTER
Side pain that began about 6 months ago. But in the last month he has noticed it has been constant all day long on the left side. Up from his hip between his bellybutton and his side. Severe anxiety attacks at night. Ran protocol below. Pt would like to be seen by PCP. Advised no appointments available today. Pt is not in any pain today, and wants to wait until tomorrow to be seen. Appointment scheduled. Advised on s/s of when to be seen urgently. Advised to call back if symptoms change/worsen. Pt verbalized understanding. No additional questions at this time.     Thank you - RE EpsteinN, RN    Reason for Disposition   Patient wants to be seen    Additional Information   Negative: Passed out (i.e., fainted, collapsed and was not responding)   Negative: Shock suspected (e.g., cold/pale/clammy skin, too weak to stand, low BP, rapid pulse)   Negative: Sounds like a life-threatening emergency to the triager   Negative: Followed an abdomen (stomach) injury   Negative: Chest pain   Negative: Pain is mainly in upper abdomen (if needed ask: 'is it mainly above the belly button?')   Negative: Abdomen bloating or swelling are main symptoms   Negative: SEVERE abdominal pain (e.g., excruciating)   Negative: Vomiting red blood or black (coffee ground) material   Negative: Blood in bowel movements  (Exception: Blood on surface of BM with constipation.)   Negative: Black or tarry bowel movements  (Exception: Chronic-unchanged black-grey BMs AND is taking iron pills or Pepto-Bismol.)   Negative: Unable to urinate (or only a few drops) and bladder feels very full   Negative: Pain in scrotum persists > 1 hour   Negative: MILD TO MODERATE constant pain lasting > 2 hours   Negative: Vomiting bile (green color)   Negative: Patient sounds very sick or weak to the triager   Negative: Vomiting and abdomen looks much more swollen than usual   Negative: White of the eyes have turned yellow (i.e., jaundice)   Negative: Blood in urine  "(red, pink, or tea-colored)   Negative: Fever > 103 F (39.4 C)   Negative: Fever > 101 F (38.3 C) and over 60 years of age   Negative: Fever > 100.0 F (37.8 C) and has diabetes mellitus or a weak immune system (e.g., HIV positive, cancer chemotherapy, organ transplant, splenectomy, chronic steroids)   Negative: Fever > 100.0 F (37.8 C) and bedridden (e.g., CVA, chronic illness, recovering from surgery)   Negative: MODERATE pain (e.g., interferes with normal activities) that comes and goes (cramps) lasts > 24 hours  (Exception: Pain with Vomiting or Diarrhea - see that Protocol.)   Negative: Age > 60 years    Answer Assessment - Initial Assessment Questions  1. LOCATION: \"Where does it hurt?\"       LLQ  2. RADIATION: \"Does the pain shoot anywhere else?\" (e.g., chest, back)      No  3. ONSET: \"When did the pain begin?\" (Minutes, hours or days ago)       6 months ago - but no pain today  4. SUDDEN: \"Gradual or sudden onset?\"      Sudden  5. PATTERN \"Does the pain come and go, or is it constant?\"     - If it comes and goes: \"How long does it last?\" \"Do you have pain now?\"      (Note: Comes and goes means the pain is intermittent. It goes away completely between bouts.)     - If constant: \"Is it getting better, staying the same, or getting worse?\"       (Note: Constant means the pain never goes away completely; most serious pain is constant and gets worse.)       Constant  6. SEVERITY: \"How bad is the pain?\"  (e.g., Scale 1-10; mild, moderate, or severe)     - MILD (1-3): Doesn't interfere with normal activities, abdomen soft and not tender to touch.      - MODERATE (4-7): Interferes with normal activities or awakens from sleep, abdomen tender to touch.      - SEVERE (8-10): Excruciating pain, doubled over, unable to do any normal activities.        Currently - 0/10. Yesterday 8/10  7. RECURRENT SYMPTOM: \"Have you ever had this type of stomach pain before?\" If Yes, ask: \"When was the last time?\" and \"What happened that " "time?\"       No  8. CAUSE: \"What do you think is causing the stomach pain?\"      Pt thinks he may have a hernia or something wrong with his intestine.  9. RELIEVING/AGGRAVATING FACTORS: \"What makes it better or worse?\" (e.g., antacids, bending or twisting motion, bowel movement)      It does not get better or worse with a bowel movement.  10. OTHER SYMPTOMS: \"Do you have any other symptoms?\" (e.g., back pain, diarrhea, fever, urination pain, vomiting)        N/a    Protocols used: Abdominal Pain - Male-A-[place]    "

## 2024-09-11 ENCOUNTER — ANCILLARY PROCEDURE (OUTPATIENT)
Dept: GENERAL RADIOLOGY | Facility: CLINIC | Age: 39
End: 2024-09-11
Attending: FAMILY MEDICINE
Payer: COMMERCIAL

## 2024-09-11 ENCOUNTER — OFFICE VISIT (OUTPATIENT)
Dept: FAMILY MEDICINE | Facility: CLINIC | Age: 39
End: 2024-09-11
Payer: COMMERCIAL

## 2024-09-11 VITALS
HEIGHT: 68 IN | WEIGHT: 252 LBS | RESPIRATION RATE: 20 BRPM | BODY MASS INDEX: 38.19 KG/M2 | OXYGEN SATURATION: 98 % | TEMPERATURE: 98.1 F | SYSTOLIC BLOOD PRESSURE: 129 MMHG | HEART RATE: 80 BPM | DIASTOLIC BLOOD PRESSURE: 86 MMHG

## 2024-09-11 DIAGNOSIS — R10.32 LLQ ABDOMINAL PAIN: Primary | ICD-10-CM

## 2024-09-11 DIAGNOSIS — M1A.09X0 IDIOPATHIC CHRONIC GOUT OF MULTIPLE SITES WITHOUT TOPHUS: ICD-10-CM

## 2024-09-11 DIAGNOSIS — R10.32 LLQ ABDOMINAL PAIN: ICD-10-CM

## 2024-09-11 DIAGNOSIS — Z23 ENCOUNTER FOR IMMUNIZATION: ICD-10-CM

## 2024-09-11 DIAGNOSIS — R41.840 ATTENTION DEFICIT: ICD-10-CM

## 2024-09-11 LAB
ALBUMIN UR-MCNC: NEGATIVE MG/DL
APPEARANCE UR: CLEAR
BASOPHILS # BLD AUTO: 0.1 10E3/UL (ref 0–0.2)
BASOPHILS NFR BLD AUTO: 1 %
BILIRUB UR QL STRIP: NEGATIVE
COLOR UR AUTO: YELLOW
EOSINOPHIL # BLD AUTO: 0.2 10E3/UL (ref 0–0.7)
EOSINOPHIL NFR BLD AUTO: 2 %
ERYTHROCYTE [DISTWIDTH] IN BLOOD BY AUTOMATED COUNT: 12.2 % (ref 10–15)
GLUCOSE UR STRIP-MCNC: NEGATIVE MG/DL
HCT VFR BLD AUTO: 45.4 % (ref 40–53)
HGB BLD-MCNC: 15.5 G/DL (ref 13.3–17.7)
HGB UR QL STRIP: NEGATIVE
IMM GRANULOCYTES # BLD: 0 10E3/UL
IMM GRANULOCYTES NFR BLD: 0 %
KETONES UR STRIP-MCNC: NEGATIVE MG/DL
LEUKOCYTE ESTERASE UR QL STRIP: NEGATIVE
LYMPHOCYTES # BLD AUTO: 2.5 10E3/UL (ref 0.8–5.3)
LYMPHOCYTES NFR BLD AUTO: 30 %
MCH RBC QN AUTO: 29.5 PG (ref 26.5–33)
MCHC RBC AUTO-ENTMCNC: 34.1 G/DL (ref 31.5–36.5)
MCV RBC AUTO: 86 FL (ref 78–100)
MONOCYTES # BLD AUTO: 0.5 10E3/UL (ref 0–1.3)
MONOCYTES NFR BLD AUTO: 6 %
NEUTROPHILS # BLD AUTO: 5.1 10E3/UL (ref 1.6–8.3)
NEUTROPHILS NFR BLD AUTO: 61 %
NITRATE UR QL: NEGATIVE
PH UR STRIP: 6 [PH] (ref 5–7)
PLATELET # BLD AUTO: 219 10E3/UL (ref 150–450)
RBC # BLD AUTO: 5.26 10E6/UL (ref 4.4–5.9)
RBC #/AREA URNS AUTO: NORMAL /HPF
SP GR UR STRIP: <=1.005 (ref 1–1.03)
UROBILINOGEN UR STRIP-ACNC: 0.2 E.U./DL
WBC # BLD AUTO: 8.3 10E3/UL (ref 4–11)
WBC #/AREA URNS AUTO: NORMAL /HPF

## 2024-09-11 PROCEDURE — 85025 COMPLETE CBC W/AUTO DIFF WBC: CPT | Performed by: FAMILY MEDICINE

## 2024-09-11 PROCEDURE — 90471 IMMUNIZATION ADMIN: CPT | Performed by: FAMILY MEDICINE

## 2024-09-11 PROCEDURE — 81001 URINALYSIS AUTO W/SCOPE: CPT | Performed by: FAMILY MEDICINE

## 2024-09-11 PROCEDURE — 99214 OFFICE O/P EST MOD 30 MIN: CPT | Mod: 25 | Performed by: FAMILY MEDICINE

## 2024-09-11 PROCEDURE — 84550 ASSAY OF BLOOD/URIC ACID: CPT | Performed by: FAMILY MEDICINE

## 2024-09-11 PROCEDURE — 74019 RADEX ABDOMEN 2 VIEWS: CPT | Mod: TC | Performed by: RADIOLOGY

## 2024-09-11 PROCEDURE — 36415 COLL VENOUS BLD VENIPUNCTURE: CPT | Performed by: FAMILY MEDICINE

## 2024-09-11 PROCEDURE — 90715 TDAP VACCINE 7 YRS/> IM: CPT | Performed by: FAMILY MEDICINE

## 2024-09-11 PROCEDURE — 80053 COMPREHEN METABOLIC PANEL: CPT | Performed by: FAMILY MEDICINE

## 2024-09-11 RX ORDER — ATOMOXETINE 40 MG/1
40 CAPSULE ORAL DAILY
Qty: 7 CAPSULE | Refills: 0 | Status: SHIPPED | OUTPATIENT
Start: 2024-09-11 | End: 2024-09-18

## 2024-09-11 RX ORDER — ATOMOXETINE 80 MG/1
80 CAPSULE ORAL DAILY
Qty: 30 CAPSULE | Refills: 3 | Status: SHIPPED | OUTPATIENT
Start: 2024-09-11

## 2024-09-11 ASSESSMENT — ENCOUNTER SYMPTOMS: ABDOMINAL PAIN: 1

## 2024-09-11 NOTE — PATIENT INSTRUCTIONS
At [facility], we strive to deliver an exceptional experience to you, every time we see you. If you receive a survey, please let us know what we are doing well and/or what we could improve upon, as we do value your feedback.  If you have MyChart, you can expect to receive results automatically within 24 hours of their completion.  Your provider will send a note interpreting your results as well.   If you do not have MyChart, you should receive your results in about a week by mail.    Your care team:                            Family Medicine Internal Medicine   MD Felix Stewart, MD Vivian López, MD Brendan Irene, MD Denise Boyle, PACatinaC    Jack Watkins, MD Pediatrics   Lula Palafox, MD Linda Lira, MD Laina Nash, APRN CNP Angela Grant APRN CNP   MD Aylin Cervantes, MD Zahraa James, CNP     Jimenez Quinn, CNP Same-Day Provider (No follow-up visits)   JOHANNE Lerma, DNP Chyna Christina, JOHANNE Rogel, FNP, BC JEB MirandaC     Clinic hours: Monday - Thursday 7 am-6 pm; Fridays 7 am-5 pm.   Urgent care: Monday - Friday 10 am- 8 pm; Saturday and Sunday 9 am-5 pm.    Clinic: [phone]       [facility]: Monday - Thursday 8 am - 7 pm; Friday 8 am - 6 pm  [facility]: [phone]

## 2024-09-11 NOTE — NURSING NOTE
Prior to immunization administration, verified patients identity using patient s name and date of birth. Please see Immunization Activity for additional information.     Screening Questionnaire for Adult Immunization    Are you sick today?   No   Do you have allergies to medications, food, a vaccine component or latex?   No   Have you ever had a serious reaction after receiving a vaccination?   No   Do you have a long-term health problem with heart, lung, kidney, or metabolic disease (e.g., diabetes), asthma, a blood disorder, no spleen, complement component deficiency, a cochlear implant, or a spinal fluid leak?  Are you on long-term aspirin therapy?   Yes   Do you have cancer, leukemia, HIV/AIDS, or any other immune system problem?   No   Do you have a parent, brother, or sister with an immune system problem?   No   In the past 3 months, have you taken medications that affect  your immune system, such as prednisone, other steroids, or anticancer drugs; drugs for the treatment of rheumatoid arthritis, Crohn s disease, or psoriasis; or have you had radiation treatments?   No   Have you had a seizure, or a brain or other nervous system problem?   No   During the past year, have you received a transfusion of blood or blood    products, or been given immune (gamma) globulin or antiviral drug?   No   For women: Are you pregnant or is there a chance you could become       pregnant during the next month?   No   Have you received any vaccinations in the past 4 weeks?   No     Patient instructed to remain in clinic for 15 minutes afterwards, and to report any adverse reactions.     Screening performed by Eva Barboza MA on 9/11/2024 at 2:14 PM.

## 2024-09-11 NOTE — PROGRESS NOTES
"    Danna Rendon is a 38 year old, presenting for the following health issues:  Abdominal Pain (LLQ)      9/11/2024     1:40 PM   Additional Questions   Roomed by Kaleb   Accompanied by Self     Abdominal Pain     History of Present Illness       Reason for visit:  Side stomach issue  Symptom onset:  More than a month  Symptoms include:  Pain in side  Symptom intensity:  Moderate  Symptom progression:  Staying the same  Had these symptoms before:  Yes  Has tried/received treatment for these symptoms:  No He is missing 1 dose(s) of medications per week.  He is not taking prescribed medications regularly due to remembering to take.     Patient stated pain occurs 4-5 times per week lasting 2 hours per day that comes and goes. No f/c. No n/v. No recent weight loss, feeling fatigue. No changes to bm's. No urinary symptoms. No rashes. No history of trauma. Patient asymptomatic today.    Patient has history of ADHD. 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 Patient was wondering with he can trial a medication to help. Patient use to be on stimulants in his teens and these caused him to feel like a \"zompie.\"        Review of Systems  Constitutional, HEENT, cardiovascular, pulmonary, GI, , musculoskeletal, neuro, skin, endocrine and psych systems are negative, except as otherwise noted.      Objective    BP (!) 137/90 (BP Location: Left arm, Patient Position: Chair, Cuff Size: Adult Large)   Pulse 80   Temp 98.1  F (36.7  C) (Temporal)   Resp 20   Ht 1.725 m (5' 7.91\")   Wt 114.3 kg (252 lb)   SpO2 98%   BMI 38.41 kg/m    Body mass index is 38.41 kg/m .  Physical Exam   GENERAL: alert and no distress  NECK: no adenopathy, no asymmetry, masses, or scars  RESP: lungs clear to auscultation - no rales, rhonchi or wheezes  CV: regular rate and rhythm, normal S1 S2, no S3 or S4, no murmur, click or rub, no peripheral edema  ABDOMEN: soft, nontender, no " hepatosplenomegaly, no masses and bowel sounds normal  MS: no gross musculoskeletal defects noted, no edema    A/P:  (R10.32) LLQ abdominal pain  (primary encounter diagnosis)  Comment:   Plan: CBC with platelets and differential,         Comprehensive metabolic panel (BMP + Alb, Alk         Phos, ALT, AST, Total. Bili, TP), UA with         Microscopic reflex to Culture - lab collect, XR        Abdomen 2 Views, US Abdomen Complete        No tenderness today. Unclear etiology? Diffs: muscular strain, hernia, colitis, constipation, ca's. Will check labs, xrays and abdominal ultrasound. If worsening, will or CT scan but did not at this time due to benign exam today.    (R41.840) Attention deficit  Comment:   Plan: atomoxetine (STRATTERA) 40 MG capsule,         atomoxetine (STRATTERA) 80 MG capsule        Trial Strattera. Discussed potential side effects.    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 Bili, TP), Uric acid        Recheck uric acid.    (Z23) Encounter for immunization  Comment:   Plan: TDAP 10-64Y (ADACEL,BOOSTRIX)                  Signed Electronically by: Jack Watkins MD, MD

## 2024-09-12 LAB
ALBUMIN SERPL BCG-MCNC: 4.8 G/DL (ref 3.5–5.2)
ALP SERPL-CCNC: 68 U/L (ref 40–150)
ALT SERPL W P-5'-P-CCNC: 50 U/L (ref 0–70)
ANION GAP SERPL CALCULATED.3IONS-SCNC: 12 MMOL/L (ref 7–15)
AST SERPL W P-5'-P-CCNC: 37 U/L (ref 0–45)
BILIRUB SERPL-MCNC: 0.5 MG/DL
BUN SERPL-MCNC: 14.7 MG/DL (ref 6–20)
CALCIUM SERPL-MCNC: 9.6 MG/DL (ref 8.8–10.4)
CHLORIDE SERPL-SCNC: 102 MMOL/L (ref 98–107)
CREAT SERPL-MCNC: 0.93 MG/DL (ref 0.67–1.17)
EGFRCR SERPLBLD CKD-EPI 2021: >90 ML/MIN/1.73M2
GLUCOSE SERPL-MCNC: 90 MG/DL (ref 70–99)
HCO3 SERPL-SCNC: 24 MMOL/L (ref 22–29)
POTASSIUM SERPL-SCNC: 4 MMOL/L (ref 3.4–5.3)
PROT SERPL-MCNC: 7.8 G/DL (ref 6.4–8.3)
SODIUM SERPL-SCNC: 138 MMOL/L (ref 135–145)
URATE SERPL-MCNC: 7.8 MG/DL (ref 3.4–7)

## 2025-07-11 ENCOUNTER — ANCILLARY PROCEDURE (OUTPATIENT)
Dept: GENERAL RADIOLOGY | Facility: CLINIC | Age: 40
End: 2025-07-11
Attending: STUDENT IN AN ORGANIZED HEALTH CARE EDUCATION/TRAINING PROGRAM
Payer: COMMERCIAL

## 2025-07-11 DIAGNOSIS — M79.672 PAIN OF LEFT HEEL: ICD-10-CM

## 2025-07-11 PROCEDURE — 73650 X-RAY EXAM OF HEEL: CPT | Mod: TC | Performed by: RADIOLOGY

## 2025-07-11 PROCEDURE — 73610 X-RAY EXAM OF ANKLE: CPT | Mod: TC | Performed by: RADIOLOGY

## 2025-07-13 ENCOUNTER — HEALTH MAINTENANCE LETTER (OUTPATIENT)
Age: 40
End: 2025-07-13